# Patient Record
Sex: FEMALE | Race: BLACK OR AFRICAN AMERICAN | Employment: UNEMPLOYED | URBAN - METROPOLITAN AREA
[De-identification: names, ages, dates, MRNs, and addresses within clinical notes are randomized per-mention and may not be internally consistent; named-entity substitution may affect disease eponyms.]

---

## 2019-09-18 PROBLEM — N81.10 FEMALE CYSTOCELE: Status: ACTIVE | Noted: 2017-08-15

## 2019-10-23 ENCOUNTER — OFFICE VISIT (OUTPATIENT)
Dept: FAMILY MEDICINE CLINIC | Age: 74
End: 2019-10-23

## 2019-10-23 VITALS
HEIGHT: 64 IN | BODY MASS INDEX: 28.1 KG/M2 | DIASTOLIC BLOOD PRESSURE: 63 MMHG | TEMPERATURE: 96.8 F | SYSTOLIC BLOOD PRESSURE: 135 MMHG | HEART RATE: 62 BPM | RESPIRATION RATE: 16 BRPM | WEIGHT: 164.6 LBS | OXYGEN SATURATION: 97 %

## 2019-10-23 DIAGNOSIS — T86.10 RENAL TRANSPLANT DISORDER: ICD-10-CM

## 2019-10-23 DIAGNOSIS — D64.9 ANEMIA, UNSPECIFIED TYPE: ICD-10-CM

## 2019-10-23 DIAGNOSIS — E78.5 DYSLIPIDEMIA: ICD-10-CM

## 2019-10-23 DIAGNOSIS — F41.9 ANXIETY: Primary | ICD-10-CM

## 2019-10-23 DIAGNOSIS — D51.9 ANEMIA DUE TO VITAMIN B12 DEFICIENCY, UNSPECIFIED B12 DEFICIENCY TYPE: ICD-10-CM

## 2019-10-23 DIAGNOSIS — I48.0 PAF (PAROXYSMAL ATRIAL FIBRILLATION) (HCC): ICD-10-CM

## 2019-10-23 DIAGNOSIS — R73.03 PREDIABETES: ICD-10-CM

## 2019-10-23 DIAGNOSIS — M81.0 AGE-RELATED OSTEOPOROSIS WITHOUT CURRENT PATHOLOGICAL FRACTURE: ICD-10-CM

## 2019-10-23 DIAGNOSIS — D84.9 IMMUNOSUPPRESSED STATUS (HCC): ICD-10-CM

## 2019-10-23 DIAGNOSIS — D05.11 DUCTAL CARCINOMA IN SITU (DCIS) OF RIGHT BREAST: ICD-10-CM

## 2019-10-23 DIAGNOSIS — I10 ESSENTIAL HYPERTENSION: ICD-10-CM

## 2019-10-23 DIAGNOSIS — R79.9 ABNORMAL FINDING OF BLOOD CHEMISTRY, UNSPECIFIED: ICD-10-CM

## 2019-10-23 DIAGNOSIS — Z79.01 CHRONIC ANTICOAGULATION: ICD-10-CM

## 2019-10-23 DIAGNOSIS — Z79.52 LONG TERM CURRENT USE OF SYSTEMIC STEROIDS: ICD-10-CM

## 2019-10-23 RX ORDER — FUROSEMIDE 40 MG/1
40 TABLET ORAL
COMMUNITY

## 2019-10-23 RX ORDER — TRAMADOL HYDROCHLORIDE 300 MG/1
TABLET, EXTENDED RELEASE ORAL
Refills: 0 | COMMUNITY
Start: 2019-10-16 | End: 2020-01-23 | Stop reason: DRUGHIGH

## 2019-10-23 RX ORDER — PRAVASTATIN SODIUM 20 MG/1
1 TABLET ORAL DAILY
Refills: 0 | COMMUNITY
Start: 2019-08-06 | End: 2020-07-28

## 2019-10-23 RX ORDER — CARBOXYMETHYLCELLULOSE SODIUM 5 MG/ML
1 SOLUTION/ DROPS OPHTHALMIC 2 TIMES DAILY
COMMUNITY
End: 2020-09-21

## 2019-10-23 RX ORDER — PERPHENAZINE 2 MG
1000 TABLET ORAL DAILY
COMMUNITY

## 2019-10-23 RX ORDER — ESOMEPRAZOLE MAGNESIUM 40 MG/1
1 CAPSULE, DELAYED RELEASE ORAL 2 TIMES DAILY
COMMUNITY
End: 2020-09-21

## 2019-10-23 RX ORDER — DILTIAZEM HYDROCHLORIDE 60 MG/1
1 CAPSULE, EXTENDED RELEASE ORAL 2 TIMES DAILY
COMMUNITY
Start: 2019-07-16

## 2019-10-23 RX ORDER — METOPROLOL TARTRATE 50 MG/1
50 TABLET ORAL 2 TIMES DAILY
COMMUNITY
End: 2020-02-24

## 2019-10-23 RX ORDER — KETOTIFEN FUMARATE 0.35 MG/ML
SOLUTION/ DROPS OPHTHALMIC
COMMUNITY
End: 2019-10-23 | Stop reason: SDUPTHER

## 2019-10-23 RX ORDER — MELATONIN
7000
COMMUNITY

## 2019-10-23 RX ORDER — LOSARTAN POTASSIUM 100 MG/1
100 TABLET ORAL
COMMUNITY
Start: 2019-07-24

## 2019-10-23 NOTE — PROGRESS NOTES
[address] [facility]  Primary Care Office Visit - Problem-Oriented    : 1945   Víctor Troncoso is a 69 y.o. female presenting for  Chief Complaint   Patient presents with   [facility] Establish Care            Assessment/Plan:       ICD-10-CM ICD-9-CM   1. Anxiety F41.9 300.00   2. Immunosuppressed status ([address] D89.9 279.9   3. PAF (paroxysmal atrial fibrillation) (HCC) I48.0 427.31   4. Chronic anticoagulation Z79.01 V58.61   5. Long term current use of systemic steroids Z79.52 V58.65   6. Essential hypertension I10 401.9   7. Dyslipidemia E78.5 272.4   8. 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 233.0   9. Renal transplant disorder T86.10 996.81   10. Prediabetes R73.03 790.29     Initial encounter with Víctor Troncoso to establish care. Very complex medical history. Reviewed records available via [employer]. Requesting records from the other providers (renal, ID, psych, general surgery, rheum). Víctor Troncoso is a very intelligent and proactive patient. Main concern is that she'd like to make sure that her health maintenance items are \"not lost in the shuffle\". Future considerations:  - update lipid and A1c  - will defer vaccinations to ID (shingrix? PCV13?)  - will review records for c-scope and DEXA    Spent 45min face-to-face, >50% spent on extensive history review with patient as well as counseling re: high incidence of anxiety/depression in patients who suffer from chronic disease. This document may have been created with the aid of dictation software. Text may contain errors, particularly phonetic errors. Reviewed management plan & instructions with patient, who voiced understanding.          Slim Ernst MD  Internal Medicine, Family Medicine & Sports Medicine  10/23/2019    Subjective   History:   Víctor Troncoso is a 69 y.o. female presenting to address:  Chief Complaint   Patient presents with   [facility] Establish Care     Here to establish care with a new PCP.      Recent abscess / infected lumbar hardware s/p removal:  PICC line since 9/6/2019 in [place]  Overall doing okay  Managed by Dr. Laly Cano    Hip OA:  Wants a hip replacement, however knows she needs to wait until cleared by ID    Anxiety:  Followed by psych, Dr. Julián Rodas  Worsens with health issues, naturally  Admits she is afraid of taking strong pain medication  Doesn't like being on the xanax, will be trying to wean down  Sees counselor with Dr. Yajaira [place] office, Abbie Leyva    Pain mgmt:  Dr. Mendoza Lias for tramadol  \"I really don't like taking pain medication at all\"    S/p renal transplant: Followed by renal (Joycelyn) and ID (Sunny)    Hx of breast cancer:  S/p mastectomy  tx plan is currently observation        Referred by Dr. Anita De La Cruz team / frequency of appts:  Renal: (Joycelyn) Every 4-5 months.   ID: Layton Soni) started in September; q2wk  Cards: Prosper Wen) q6mo  Neurosurgeon: (alexx) only PRN  Heme/onc: Jw Sofia next month      Past Medical History:   Diagnosis Date    Anxiety     Arthropathy     Atrophic vaginitis     Bladder tumor     Coronary artery disease     Crohn disease ([address]     Depression     DJD (degenerative joint disease)     Esophageal reflux     Female cystocele     Fibrocystic breast     Fibromyalgia     GERD (gastroesophageal reflux disease)     History of kidney transplant     Hypercholesteremia     Hypertension     IBS (irritable bowel syndrome)     Lower urinary tract symptoms (LUTS)     Nephrogenic adenoma of bladder     Nocturia     Osteopenia     Pelvic organ prolapse quantification stage 1 cystocele     Renal failure     Shingles     [facility]ogren's syndrome [facility]     Sleep apnea      Past Surgical History:   Procedure Laterality Date    HX BACK SURGERY  4/2014    HX BACK SURGERY  09/2019    hardware removed    HX BREAST LUMPECTOMY  1998    HX CERVICAL FUSION  2007    HX COLECTOMY  1984    HX CORONARY STENT PLACEMENT  2005;2008    HX GYN  HX HEART CATHETERIZATION  1/2008    HX HEMORRHOIDECTOMY  1993    HX KNEE REPLACEMENT  2008    HX KNEE REPLACEMENT  3/2015    left    HX LIPECTOMY  2001    HX MOHS PROCEDURES  1996    HX PARATHYROIDECTOMY  1993    HX RENAL TRANSPLANT  1992    HX RENAL TRANSPLANT  1992    HX TUBAL LIGATION  1977      reports that she has quit smoking. She has never used smokeless tobacco. She reports that she does not drink alcohol or use drugs. Social History     Social History Narrative    Oct 2019:  x 38 years. Used to work for [employer]. Received kidney transplant in 1992. Social History     Tobacco Use   Smoking Status Former Smoker   Smokeless Tobacco Never Used     Family History   Problem Relation Age of Onset    Ovarian Cancer Mother     Diabetes Sister     Hypertension Sister     Diabetes Brother     Hypertension Brother     Hypertension Brother     Diabetes Sister      Allergies   Allergen Reactions    Atorvastatin Myalgia     Other reaction(s): Unknown    Azathioprine Other (comments)     Other reaction(s): Unknown    Iodinated Contrast Media Other (comments)     Had kidney transplant      Ketorolac Tromethamine Other (comments) and Itching       Problem List:      Patient Active Problem List    Diagnosis    Long term current use of systemic steroids    Renal transplant disorder    Ductal carcinoma in situ (DCIS) of right breast    Female cystocele    Immunosuppressed status ([address]    PAF ([facility]oxysmal atrial fibrillation) [facility]    Prediabetes    Thoracic spondylosis without myelopathy    Stented coronary artery    Bladder tumor    Cervical spondylosis without myelopathy    Dyslipidemia    Essential hypertension    Sjogren's syndrome (HCC)       Medications:     Current Outpatient Medications   Medication Sig    carboxymethylcellulose sodium (CELLUVISC) 0.5 % drop ophthalmic solution Administer 1 Drop to both eyes two (2) times a day.     cholecalciferol (VITAMIN D3) (1000 Units /25 mcg) tablet Take 7,000 Units by mouth every three (3) days.  denosumab (PROLIA) 60 mg/mL injection 60 mg by SubCUTAneous route every 6 months.  dilTIAZem SR (CARDIZEM SR) 60 mg SR capsule Take 1 Cap by mouth two (2) times a day.  esomeprazole (NEXIUM) 40 mg capsule Take 1 Cap by mouth two (2) times a day.  krill-om-3-dha-epa-phospho-ast (KRILL OIL) 1,170-790-99-80 mg cap Take 1,000 mg by mouth two (2) times a day.  methylcellulose (CITRUCEL) 500 mg tablet Take 500 mg by mouth daily.  metoprolol tartrate (LOPRESSOR) 50 mg tablet Take 50 mg by mouth two (2) times a day.  pravastatin (PRAVACHOL) 20 mg tablet Take 1 Tab by mouth daily.  furosemide (LASIX) 40 mg tablet Take 40 mg by mouth daily as needed.  losartan (COZAAR) 100 mg tablet Take 100 mg by mouth nightly.  traMADol (ULTRAM-ER) 300 mg tablet take 1 tablet by mouth once daily DISCONTINUE TRAMADOL 50 MG    cefTRIAXone (ROCEPHIN) 2 gram/100 mL IVPB 2 g by IntraVENous route every twenty-four (24) hours.  multivit-min/FA/lutein/zeaxant (MACULAR VITAMIN PO) Take 1 Tab by mouth daily.  Lactobacillus acidophilus (PROBIOTIC PO) Take 1 Cap by mouth daily.  warfarin (COUMADIN) 5 mg tablet Take 5 mg by mouth daily.  ketotifen (ZADITOR) 0.025 % (0.035 %) ophthalmic solution Administer 1 Drop to both eyes two (2) times a day.  acyclovir (ZOVIRAX) 5 % ointment Apply  to affected area every three (3) hours.  magnesium oxide (MAG-OX) 400 mg tablet Take 800 mg by mouth two (2) times a day.  potassium chloride (K-DUR, KLOR-CON) 20 mEq tablet Take 20 mEq by mouth daily.  predniSONE (DELTASONE) 5 mg tablet Take 5 mg by mouth daily.  ALPRAZolam (XANAX) 0.5 mg tablet Take 0.5 mg by mouth two (2) times daily as needed.  mycophenolate sodium (MYFORTIC) 360 mg delayed release tablet Take 720 mg by mouth two (2) times a day. No current facility-administered medications for this visit.         Review of Systems:     Review of Systems   Constitutional: Negative for chills and fever. HENT: Negative for ear pain and sore throat. Respiratory: Negative for cough and shortness of breath. Cardiovascular: Negative for chest pain and palpitations. Gastrointestinal: Negative for abdominal pain. Genitourinary: Negative for dysuria. Musculoskeletal: Positive for back pain. Negative for myalgias. Skin: Negative for rash. Neurological: Negative for speech change, focal weakness and headaches. Endo/Heme/Allergies: Does not bruise/bleed easily. Psychiatric/Behavioral: Negative for depression. The patient is nervous/anxious. The patient does not have insomnia. Objective   Physical Assessment:   VS:    Vitals:    10/23/19 1415   BP: 135/63   Pulse: 62   Resp: 16   Temp: 96.8 °F (36 °C)   TempSrc: Oral   SpO2: 97%   Weight: 164 lb 9.6 oz (74.7 kg)   Height: 5' 4\" (1.626 m)   PainSc:   4   PainLoc: Back     Physical Exam   Constitutional: She is oriented to person, place, and time. She appears well-developed and well-nourished. overweight   HENT:   Head: Normocephalic and atraumatic. Eyes: EOM are normal.   Neck: Neck supple. No thyromegaly present. Cardiovascular: Normal rate, regular rhythm and intact distal pulses. No murmur heard. Pulmonary/Chest: Effort normal and breath sounds normal. She has no wheezes. She has no rales. Abdominal:       Musculoskeletal:        Back:    Neurological: She is alert and oriented to person, place, and time. No cranial nerve deficit. Coordination normal.   Skin: Skin is warm and dry. She is not diaphoretic. Psychiatric: She has a normal mood and affect. Her behavior is normal. Judgment and thought content normal.   Nursing note reviewed. Records Review:     Last PCP note (10/4/2019):    OM spine/infected hardware-cont CTX per PICC per ID. No pain, fever or chills more than usual. NSG treating her pain with narcotics.     HTN-taking all medication, without dizziness, chest pain, SOB or HA. Dyslipidemia-cont pravastatin without significant myalgia    PAF-Denies SOB. No bleeding while on coumadin. Occasional palpitations, but rapidly resolves. S/p kidney transplant-cont immunosuppression. No mention of symptoms of uremia, such as itching, nausea or mental status changes. Followed by nephrology and transplant ID. Has vague sx of feeling \"heavy headed,\" dizzy. Not vertigo or pre-syncope    Anemia of chronic disease-Hb about 10 and stable. PMH/PSH/FH/MEDS/ALLERGIES/SH:All reviewed and updated today. Please see history and medication list in EPIC. ASSESSMENT AND PLAN:    (E78.5) Dyslipidemia    (I48.0) PAF (paroxysmal atrial fibrillation)     (D89.9) Immunocompromised patient ([address]    (G89.4) Chronic pain syndrome    (Z94.0) Kidney replaced by transplant    (D89.9) Immunosuppressed status     (M86.9) Infection of lumbar spine (HCC)    (D63.8) Anemia of chronic disease   Spine infx management per ID. Dizziness vague. Follow. HTN controlled. Cont same. Cont statin for ASCVD risk reduction  A.fib rate controlled. Cont AC for stroke risk reduction. Palpitations may be a.fib related. Sounds benign. All else stable. F/u with sub-specialists. RTC 3 mo. Neurosurgery note (9/17/2019):    Varinder Medrano returns to the office today 2 weeks status post Removal of lumbar hardware (screws, rods, and cross-link), incision and drainage of wound. The patient is doing well. Assessment:    G06.1 Abscess in epidural space of lumbar spine Yes     Status post Removal of lumbar hardware (screws, rods, and cross-link), incision and drainage of wound    Plan: The patient will follow up with me in 6 week(s)with xrays. She will call ID to arrange follow-up. .    Cardiology note (8/27/2019):      Chief complaint: CAD/PAF/chronic anticaog/SOB/htn/dyslipidemia    Impression: Varinder Medrano is a 69 y.o. female seen for   (I48.0) Paroxysmal atrial fibrillation (HCC)    (Z79.01) Chronic anticoagulation    (I25.10) Coronary artery disease involving native coronary artery of native heart without angina pectoris    (I10) Essential hypertension    (E78.5) Dyslipidemia    (Z95.5) Stented coronary artery    Recommendations: CAD/PAF/chronic anticaog/SOB/htn/dyslipidemia: Continue her current cardiac medications. At this point this point, it appears that her A. fib is relatively infrequent and self-limiting thus will not move onto antiarrhythmic therapy. Return in about 3 months (around 11/27/2019). Instructed to call the office prior to next visit if needed. SUBJECTIVE/INTERVAL EVENTS: CAD/PAF/chronic anticaog/SOB/htn/dyslipidemia: She was seen on 7/16. At that time, she was reporting some increased palpitations and she also had a documented episode of A. fib during a colonoscopy on 7/11. She was changed from nifedipine to diltiazem SR 60 mg twice per day. A 2-week monitor was obtained. This demonstrated sinus rhythm as the primary rhythm. There was one episode of self-limiting A. fib. The overall A. fib burden was less than 1%. Today in the office she reports that she has had a couple of non-cardiac problems. She has had a couple of abscesses that needed to be drained. She also has significant hip pain and was recommended to undergo hip replacement. This is on hold until her infections are completely resolved. Generally blood pressures are controlled except for when she is in pain. She is not having any problems with her Coumadin. She denies any palpitations since her last office visit in July. In fact she was hospitalized for at her abscess for a few days on telemetry without any A. fib as well. No chest pain, tightness, or pressure. No SOB, orthopnea, or PND. No dizziness, lightheadedness, or syncope. No edema. No palpitations. Review of systems: No fever, chills, nausea, vomiting. No bleeding.      Heme/onc note (8/22/2019):    ONCOLOGY HISTORY  I. pTXpN0(i+). Right DCIS is ER positive, UT negative, her 2 Karen positive (3+). [place] nodes: ER negative, UT negative, her 2-. A. Bilateral mammogram, 08/06/2018. 3 mm grouping of amorphous microcalcifications at 9 o'clock in the middle 1/3 of the right breast, suspicious. B. stereotactic core biopsies of the right breast, 08/16/2018. DCIS, nuclear grade 2. Cribriform type with central necrosis and calcifications. ER positive, UT positive. C. Excisional biopsy of the right breast, 09/27/2018, by Dr. Deepa Faust. DCIS, grade 2. Approximately 3.3 cm. DCIS extends to medial, inferior, and deep margins. 0/1 nodes. ER positive, UT positive. D. Bilateral mastectomy and sentinel node biopsy, 11/06/2018 by Dr. Elizabeth Cartwright. Left breast: Simple fibrocystic disease of the breast. Sclerosing adenosis. No carcinoma seen. Right breast: Residual ductal carcinoma in situ in the lateral inferior quadrant measuring at least 4.5 x 4.2 x 3.5 cm in greatest dimension. Grade 2. No invasive carcinoma seen. ER positive, UT negative, her 2 Karen positive (3+). Right axillary sentinel nodes: 2/2 with metastatic ductal carcinoma (less than 200 cells). [place] lymph nodes: ER negative, UT negative, her 2 Karen negative. PTXpN0(i+)  BELIA Loly on 11/20/18: Negative for clinically significant mutations. F. Adjuvant Radiation under the direction of Dr. Sindi Jha from 12/13/18 through 1/18/19. CURRENT TREATMENT   Observation     HPI:  The patient is a very pleasant 69 y.o. female who presents today for f/u of R breast cancer metastatic to the lymph nodes and DCIS. She states that she does not feel well. She reports a recent ED visit related to an infected abscess and need for IV antibiotics. She was admitted for several days. She continues on oral antibiotics since discharge. Reports that prior to this she suffered a fungal infection in her esophagus. She endorses that her INRs have been unstable with the use of multiple antibiotics.  Endorses use of tramadol for discomfort, with good effect. Reports that she will see Dr. Harshad Connolly in November. ECOG performance status is 0. Recent Labs & Imaging:                           L-spine XR (9/10/2019):    1. Postoperative findings as described, including removal of rods and screws from the lumbar spine. 2. No evidence for acute fracture or malalignment. No evidence for instability with flexion or extension, but very limited movement on the flexion/extension views. 3. Degenerative findings are as described. Echo (9/9/2019):     NORMAL LEFT VENTRICULAR CAVITY SIZE AND SYSTOLIC FUNCTION WITH AN EJECTION FRACTION OF 70%. DIASTOLIC FUNCTION INDETERMINATE. NORMAL RIGHT VENTRICULAR SIZE AND SYSTOLIC FUNCTION. NO HEMODYNAMICALLY SIGNIFICANT VALVULAR PATHOLOGY. INADEQUATE TRICUSPID REGURGITATION FOR PULMONARY ARTERY PRESSURE ESTIMATE. MRI L-spine (9/2/2019):    1. Right paracentral dorsal subcutaneous fluid collection at the L5/S1 level with internal pigtail drainage catheter. This collection extends to the skin surface, extends to the right laterally dorsal to the posterior right iliac bone, and extends ventrally along the paraspinous musculature, contiguous with the posterior deisy/instrumentation at the L5/S1 level. If this is infected fluid/abscess, the possibility of infection involving the hardware should be considered. 2. Additional nonspecific myositis/phlegmon involving paraspinous musculature and soft tissues dorsal to the canal at the L5 level. 3. Previous dorsal decompression and circumferential fusion L3-S1 with no high-grade canal stenosis at these levels.     4. Mild acquired junctional canal stenosis L2/3.    5. 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

## 2019-10-23 NOTE — PROGRESS NOTES
Deyvi Lowe is a 69 y.o. female (: 1945) presenting to address:    Chief Complaint   Patient presents with   [address]:    10/23/19 1415   BP: 135/63   Pulse: 62   Resp: 16   Temp: 96.8 °F (36 °C)   TempSrc: Oral   SpO2: 97%   Weight: 164 lb 9.6 oz (74.7 kg)   Height: 5' 4\" (1.626 m)   PainSc:   4   PainLoc: Back       Hearing/Vision:   No exam data present    Learning Assessment:     Learning Assessment 10/23/2019   PRIMARY LEARNER Patient   HIGHEST LEVEL OF EDUCATION - PRIMARY LEARNER  2 YEARS OF COLLEGE   BARRIERS PRIMARY LEARNER NONE   CO-LEARNER CAREGIVER No   PRIMARY LANGUAGE ENGLISH   LEARNER PREFERENCE PRIMARY LISTENING   ANSWERED BY stephenie   RELATIONSHIP SELF     Depression Screening:     3 most recent PHQ Screens 10/23/2019   Little interest or pleasure in doing things Not at all   Feeling down, depressed, irritable, or hopeless Not at all   Total Score PHQ 2 0     Fall Risk Assessment:     Fall Risk Assessment, last 12 mths 10/23/2019   Able to walk? Yes   Fall in past 12 months? No     Abuse Screening:   No flowsheet data found. Coordination of Care Questionaire:   1. Have you been to the ER, urgent care clinic since your last visit? Hospitalized since your last visit? NO    2. Have you seen or consulted any other health care providers outside of the [address] [place] [address] since your last visit? Include any pap smears or colon screening. NO    Advanced Directive:   1. Do you have an Advanced Directive? NO    2. Would you like information on Advanced Directives?  NO

## 2019-10-23 NOTE — PATIENT INSTRUCTIONS
To Do: 
- every single time you see one of your specialists, please give them my business card, and tell them to send me copies of their notes TESTING RESULTS Results will be released to [address] Normal results will be sent via mail. If you have questions about your results, please schedule a follow up appointment to discuss with your PCP. MEDICATION REFILLS Please allow at least 2 business days for refill requests to be addressed. Medication refills may be requested through your pharmacy. Refills will not be provided by the after hours/on call provider. Depression and Chronic Disease: Care Instructions Your Care Instructions A chronic disease is one that you have for a long time. Some chronic diseases can be controlled, but they usually cannot be cured. Depression is common in people with chronic diseases, but it often goes unnoticed. Many people have concerns about seeking treatment for a mental health problem. You may think it's a sign of weakness, or you don't want people to know about it. It's important to overcome these reasons for not seeking treatment. Treating depression or anxiety is good for your health. Follow-up care is a key part of your treatment and safety. Be sure to make and go to all appointments, and call your doctor if you are having problems. It's also a good idea to know your test results and keep a list of the medicines you take. How can you care for yourself at home? Watch for symptoms of depression The symptoms of depression are often subtle at first. You may think they are caused by your disease rather than depression. Or you may think it is normal to be depressed when you have a chronic disease. If you are depressed you may: · Feel sad or hopeless. · Feel guilty or worthless. · Not enjoy the things you used to enjoy. · Feel hopeless, as though life is not worth living. · Have trouble thinking or remembering. · Have low energy, and you may not eat or sleep well. · Pull away from others. · Think often about death or killing yourself. (Keep the numbers for these national suicide hotlines: 5-829-028-TALK [1-560.460.3254] and 8-748-MYSYQSZ [1-976.118.2698]. ) Get treatment By treating your depression, you can feel more hopeful and have more energy. If you feel better, you may take better care of yourself, so your health may improve. · Talk to your doctor if you have any changes in mood during treatment for your disease. · Ask your doctor for help. Counseling, antidepressant medicine, or a combination of the two can help most people with depression. Often a combination works best. Counseling can also help you cope with having a chronic disease. When should you call for help? Call 911 anytime you think you may need emergency care. For example, call if: 
  · You feel like hurting yourself or someone else.  
  · Someone you know has depression and is about to attempt or is attempting suicide.  
[facility] your doctor now or seek immediate medical care if: 
  · You hear voices.  
  · Someone you know has depression and: 
? Starts to give away his or her possessions. ? Uses illegal drugs or drinks alcohol heavily. ? Talks or writes about death, including writing suicide notes or talking about guns, knives, or pills. ? Starts to spend a lot of time alone. ? Acts very aggressively or suddenly appears calm.  
 Watch closely for changes in your health, and be sure to contact your doctor if: 
  · You do not get better as expected. Where can you learn more? Go to http://kenneth-christophe.info/. Enter L297 in the search box to learn more about \"Depression and Chronic Disease: Care Instructions. \" Current as of: May 28, 2019 Content Version: 12.2 © 0759-3234 [employer].  Care instructions adapted under license by [employer] (which disclaims liability or warranty for this information). If you have questions about a medical condition or this instruction, always ask your healthcare professional. [address] any warranty or liability for your use of this information. Patient Care Team: 
Christopher Truong MD as PCP - General (Family Practice) Richar Baron MD (Cardiology) Santa Sutherland MD (Neurosurgery) Ramila Burnette MD (Hematology and Oncology) Kwan Akins MD (Infectious Diseases) Jorge Estevez MD (Nephrology) Evelyne Newman MD (Surgery) Marizol House MD (Physical Medicine and Rehab) So Noonan MD (Psychiatry) Peg Taylor MD (Rheumatology) Carina Gutierrez MD (Obstetrics & Gynecology)

## 2019-10-29 PROBLEM — Z79.52 LONG TERM CURRENT USE OF SYSTEMIC STEROIDS: Status: ACTIVE | Noted: 2019-10-29

## 2019-10-29 PROBLEM — T86.10 RENAL TRANSPLANT DISORDER: Status: ACTIVE | Noted: 2019-08-16

## 2019-10-29 PROBLEM — D05.11 DUCTAL CARCINOMA IN SITU (DCIS) OF RIGHT BREAST: Status: ACTIVE | Noted: 2018-10-02

## 2019-12-08 ENCOUNTER — TELEPHONE (OUTPATIENT)
Dept: FAMILY MEDICINE CLINIC | Age: 74
End: 2019-12-08

## 2019-12-08 NOTE — TELEPHONE ENCOUNTER
Please call Aby Brannon and inform her that fasting bloodwork orders have been placed for her to have done prior to her upcoming appointment.     Future Appointments   Date Time Provider Liz Berumen   1/23/2020  1:30 PM Odalys Yates MD [facility]

## 2020-01-16 ENCOUNTER — HOSPITAL ENCOUNTER (OUTPATIENT)
Dept: LAB | Age: 75
Discharge: HOME | End: 2020-01-16
Payer: MEDICARE

## 2020-01-16 DIAGNOSIS — D84.9 IMMUNOSUPPRESSED STATUS (HCC): ICD-10-CM

## 2020-01-16 DIAGNOSIS — M81.0 AGE-RELATED OSTEOPOROSIS WITHOUT CURRENT PATHOLOGICAL FRACTURE: ICD-10-CM

## 2020-01-16 DIAGNOSIS — Z79.52 LONG TERM CURRENT USE OF SYSTEMIC STEROIDS: ICD-10-CM

## 2020-01-16 DIAGNOSIS — T86.10 RENAL TRANSPLANT DISORDER: ICD-10-CM

## 2020-01-16 DIAGNOSIS — R79.9 ABNORMAL FINDING OF BLOOD CHEMISTRY, UNSPECIFIED: ICD-10-CM

## 2020-01-16 DIAGNOSIS — R73.03 PREDIABETES: ICD-10-CM

## 2020-01-16 LAB
25(OH)D3 SERPL-MCNC: 41.8 NG/ML (ref 30–100)
BASOPHILS # BLD: 0 K/UL (ref 0–0.1)
BASOPHILS NFR BLD: 0 % (ref 0–2)
DIFFERENTIAL METHOD BLD: ABNORMAL
EOSINOPHIL # BLD: 0.2 K/UL (ref 0–0.4)
EOSINOPHIL NFR BLD: 3 % (ref 0–5)
ERYTHROCYTE [DISTWIDTH] IN BLOOD BY AUTOMATED COUNT: 19.8 % (ref 11.6–14.5)
HCT VFR BLD AUTO: 38.9 % (ref 35–45)
HGB BLD-MCNC: 11.9 G/DL (ref 12–16)
LYMPHOCYTES # BLD: 1.3 K/UL (ref 0.9–3.6)
LYMPHOCYTES NFR BLD: 22 % (ref 21–52)
MCH RBC QN AUTO: 20.8 PG (ref 24–34)
MCHC RBC AUTO-ENTMCNC: 30.6 G/DL (ref 31–37)
MCV RBC AUTO: 68 FL (ref 74–97)
MONOCYTES # BLD: 0.5 K/UL (ref 0.05–1.2)
MONOCYTES NFR BLD: 9 % (ref 3–10)
NEUTS SEG # BLD: 3.9 K/UL (ref 1.8–8)
NEUTS SEG NFR BLD: 66 % (ref 40–73)
PLATELET # BLD AUTO: 340 K/UL (ref 135–420)
PMV BLD AUTO: 10 FL (ref 9.2–11.8)
RBC # BLD AUTO: 5.72 M/UL (ref 4.2–5.3)
T4 FREE SERPL-MCNC: 1 NG/DL (ref 0.7–1.5)
WBC # BLD AUTO: 5.9 K/UL (ref 4.6–13.2)

## 2020-01-16 PROCEDURE — 83540 ASSAY OF IRON: CPT

## 2020-01-16 PROCEDURE — 82607 VITAMIN B-12: CPT

## 2020-01-16 PROCEDURE — 80053 COMPREHEN METABOLIC PANEL: CPT

## 2020-01-16 PROCEDURE — 80061 LIPID PANEL: CPT

## 2020-01-16 PROCEDURE — 85025 COMPLETE CBC W/AUTO DIFF WBC: CPT

## 2020-01-16 PROCEDURE — 84439 ASSAY OF FREE THYROXINE: CPT

## 2020-01-16 PROCEDURE — 84443 ASSAY THYROID STIM HORMONE: CPT

## 2020-01-16 PROCEDURE — 83036 HEMOGLOBIN GLYCOSYLATED A1C: CPT

## 2020-01-16 PROCEDURE — 82306 VITAMIN D 25 HYDROXY: CPT

## 2020-01-16 PROCEDURE — 36415 COLL VENOUS BLD VENIPUNCTURE: CPT

## 2020-01-16 PROCEDURE — 82728 ASSAY OF FERRITIN: CPT

## 2020-01-17 LAB
ALBUMIN SERPL-MCNC: 3.8 G/DL (ref 3.4–5)
ALBUMIN/GLOB SERPL: 1.1 {RATIO} (ref 0.8–1.7)
ALP SERPL-CCNC: 106 U/L (ref 45–117)
ALT SERPL-CCNC: 22 U/L (ref 13–56)
ANION GAP SERPL CALC-SCNC: 9 MMOL/L (ref 3–18)
AST SERPL-CCNC: 15 U/L (ref 10–38)
BILIRUB SERPL-MCNC: 0.3 MG/DL (ref 0.2–1)
BUN SERPL-MCNC: 12 MG/DL (ref 7–18)
BUN/CREAT SERPL: 13 (ref 12–20)
CALCIUM SERPL-MCNC: 9.7 MG/DL (ref 8.5–10.1)
CHLORIDE SERPL-SCNC: 108 MMOL/L (ref 100–111)
CHOLEST SERPL-MCNC: 245 MG/DL
CO2 SERPL-SCNC: 22 MMOL/L (ref 21–32)
CREAT SERPL-MCNC: 0.94 MG/DL (ref 0.6–1.3)
EST. AVERAGE GLUCOSE BLD GHB EST-MCNC: 137 MG/DL
GLOBULIN SER CALC-MCNC: 3.4 G/DL (ref 2–4)
GLUCOSE SERPL-MCNC: 92 MG/DL (ref 74–99)
HBA1C MFR BLD: 6.4 % (ref 4.2–5.6)
HDLC SERPL-MCNC: 74 MG/DL (ref 40–60)
HDLC SERPL: 3.3 {RATIO} (ref 0–5)
LDLC SERPL CALC-MCNC: 148.4 MG/DL (ref 0–100)
LIPID PROFILE,FLP: ABNORMAL
POTASSIUM SERPL-SCNC: 3.8 MMOL/L (ref 3.5–5.5)
PROT SERPL-MCNC: 7.2 G/DL (ref 6.4–8.2)
SODIUM SERPL-SCNC: 139 MMOL/L (ref 136–145)
TRIGL SERPL-MCNC: 113 MG/DL (ref ?–150)
TSH SERPL DL<=0.05 MIU/L-ACNC: 1.42 UIU/ML (ref 0.36–3.74)
VLDLC SERPL CALC-MCNC: 22.6 MG/DL

## 2020-01-20 LAB
FERRITIN SERPL-MCNC: 28 NG/ML (ref 8–388)
FOLATE SERPL-MCNC: 19.6 NG/ML (ref 3.1–17.5)
IRON SATN MFR SERPL: 14 %
IRON SERPL-MCNC: 50 UG/DL (ref 50–175)
TIBC SERPL-MCNC: 354 UG/DL (ref 250–450)
VIT B12 SERPL-MCNC: 710 PG/ML (ref 211–911)

## 2020-01-20 NOTE — PROGRESS NOTES
Order faxed to [phone] confirmation received   I also called and spoke to [place] and she confirmed test are being ordered.

## 2020-01-23 ENCOUNTER — OFFICE VISIT (OUTPATIENT)
Dept: FAMILY MEDICINE CLINIC | Age: 75
End: 2020-01-23

## 2020-01-23 VITALS
BODY MASS INDEX: 27.96 KG/M2 | HEART RATE: 58 BPM | RESPIRATION RATE: 16 BRPM | TEMPERATURE: 97.6 F | SYSTOLIC BLOOD PRESSURE: 152 MMHG | HEIGHT: 64 IN | WEIGHT: 163.8 LBS | DIASTOLIC BLOOD PRESSURE: 82 MMHG | OXYGEN SATURATION: 99 %

## 2020-01-23 DIAGNOSIS — Z79.01 CHRONIC ANTICOAGULATION: ICD-10-CM

## 2020-01-23 DIAGNOSIS — M35.00 SJOGREN'S SYNDROME, WITH UNSPECIFIED ORGAN INVOLVEMENT (HCC): ICD-10-CM

## 2020-01-23 DIAGNOSIS — Z13.31 SCREENING FOR DEPRESSION: ICD-10-CM

## 2020-01-23 DIAGNOSIS — D84.9 IMMUNOSUPPRESSED STATUS (HCC): ICD-10-CM

## 2020-01-23 DIAGNOSIS — Z79.52 LONG TERM CURRENT USE OF SYSTEMIC STEROIDS: ICD-10-CM

## 2020-01-23 DIAGNOSIS — I48.91 ATRIAL FIBRILLATION WITH RVR (HCC): ICD-10-CM

## 2020-01-23 DIAGNOSIS — Z13.39 SCREENING FOR ALCOHOLISM: ICD-10-CM

## 2020-01-23 DIAGNOSIS — E78.5 DYSLIPIDEMIA: ICD-10-CM

## 2020-01-23 DIAGNOSIS — M16.11 PRIMARY OSTEOARTHRITIS OF RIGHT HIP: ICD-10-CM

## 2020-01-23 DIAGNOSIS — R73.03 PREDIABETES: ICD-10-CM

## 2020-01-23 DIAGNOSIS — I25.10 CORONARY ARTERY DISEASE INVOLVING NATIVE CORONARY ARTERY OF NATIVE HEART WITHOUT ANGINA PECTORIS: ICD-10-CM

## 2020-01-23 DIAGNOSIS — Z00.00 MEDICARE ANNUAL WELLNESS VISIT, SUBSEQUENT: Primary | ICD-10-CM

## 2020-01-23 DIAGNOSIS — I10 ESSENTIAL HYPERTENSION: ICD-10-CM

## 2020-01-23 RX ORDER — ONDANSETRON 4 MG/1
4 TABLET, FILM COATED ORAL
COMMUNITY
End: 2020-09-21

## 2020-01-23 RX ORDER — CIPROFLOXACIN 500 MG/1
500 TABLET ORAL 2 TIMES DAILY
COMMUNITY
End: 2020-06-23

## 2020-01-23 RX ORDER — TRAMADOL HYDROCHLORIDE 100 MG/1
100 TABLET, EXTENDED RELEASE ORAL DAILY
COMMUNITY
Start: 2019-12-30 | End: 2020-06-23

## 2020-01-23 NOTE — PATIENT INSTRUCTIONS
To Do: - continue monitoring your blood pressure at home. Bring your blood pressure machine with you to the cardiologist. 
- keep giving my business card to all your specialists, asking them to always send me copies of their notes - complete your Advance Directive(s) and bring me copies so I can put them on file. .. so we can be certain that everywhere you go, they have your \"recipe\" of your preferences (so no one has to be stressed and worried when they likely are already at that time!) Notes from your doctor: 
- overall, bloodwork looks good 
- keep working on limiting the amount of sugar / carbs that you eat, as you are \"borderline diabetic\" - cholesterol is \"okay\". ... so keep doing your best with the pravastatin 
- show this to Dr. Cyndi Gomes, and make sure that you are as up-to-date on your vaccines as she wants you to be Medicare Wellness Notes: - Included below is some information about Advance Directives. There is also an excellent website for it for the state [facility] (www. [email or web address]); if you end up making an Advance Directive, bring me a copy so I can put it in your medical record - We did your medicare wellness visit today. Below is your \"5 year preventive services plan\" - Per our records, you are behind on some immunizations. Check with Dr. Cyndi Gomes Health Maintenance Due Topic Date Due  
 DTaP/Tdap/Td  (1 - Tdap) 04/06/1957  Shingles Vaccine (1 of 2) 04/06/1996  Pneumococcal Vaccine (1 of 1 - PPSV23) 04/06/2011 Medicare Wellness Visit, Female The best way to live healthy is to have a lifestyle where you eat a well-balanced diet, exercise regularly, limit alcohol use, and quit all forms of tobacco/nicotine, if applicable. Regular preventive services are another way to keep healthy. Preventive services (vaccines, screening tests, monitoring & exams) can help personalize your care plan, which helps you manage your own care. Screening tests can find health problems at the earliest stages, when they are easiest to treat. [facility] follows the current, evidence-based guidelines published by the [facility] States Shahbaz Zuniga ([facility]) when recommending preventive services for our patients. Because we follow these guidelines, sometimes recommendations change over time as research supports it. (For example, mammograms used to be recommended annually. Even though Medicare will still pay for an annual mammogram, the newer guidelines recommend a mammogram every two years for women of average risk). Of course, you and your doctor may decide to screen more often for some diseases, based on your risk and your co-morbidities (chronic disease you are already diagnosed with). Preventive services for you include: - Medicare offers their members a free annual wellness visit, which is time for you and your primary care provider to discuss and plan for your preventive service needs. Take advantage of this benefit every year! 
-All adults over the age of 72 should receive the recommended pneumonia vaccines. Current USPSTF guidelines recommend a series of two vaccines for the best pneumonia protection.  
-All adults should have a flu vaccine yearly and a tetanus vaccine every 10 years.  
-All adults age 48 and older should receive the shingles vaccines (series of two vaccines). -All adults age 38-68 who are overweight should have a diabetes screening test once every three years.  
-All adults born between [birth year] and 1964 should be screened once for Hepatitis C. 
-Other screening tests and preventive services for persons with diabetes include: an eye exam to screen for diabetic retinopathy, a kidney function test, a foot exam, and stricter control over your cholesterol.  
-Cardiovascular screening for adults with routine risk involves an electrocardiogram (ECG) at intervals determined by your doctor. -Colorectal cancer screenings should be done for adults age 54-65 with no increased risk factors for colorectal cancer. There are a number of acceptable methods of screening for this type of cancer. Each test has its own benefits and drawbacks. Discuss with your doctor what is most appropriate for you during your annual wellness visit. The different tests include: colonoscopy (considered the best screening method), a fecal occult blood test, a fecal DNA test, and sigmoidoscopy. 
 
-A bone mass density test is recommended when a woman turns 65 to screen for osteoporosis. This test is only recommended one time, as a screening. Some providers will use this same test as a disease monitoring tool if you already have osteoporosis. -Breast cancer screenings are recommended every other year for women of normal risk, age 54-69. 
-Cervical cancer screenings for women over age 72 are only recommended with certain risk factors. 02/13/2020 Office Visit Cardiology [address] [place] [address]  
21  (Fax)  Future Appointments Date Time Provider Liz Berumen 2/19/2020 10:15 AM Zain Mcneal  [address] [place]  
 
 
 
 
02/24/2020 Office Visit Hematology and Oncology Nikhil Epstein MD   
[place], [address] [place] [address]  
[phone] (Fax)

## 2020-01-23 NOTE — PROGRESS NOTES
Starr Benjamin is a 69 y.o. female (: 1945) presenting to address:    Chief Complaint   Patient presents with   Real Annual Wellness Visit            Vitals:    20 1348   BP: 165/87   Pulse: (!) 58   Resp: 16   Temp: 97.6 °F (36.4 °C)   TempSrc: Oral   SpO2: 99%   Weight: 163 lb 12.8 oz (74.3 kg)   Height: 5' 4\" (1.626 m)   PainSc:   8   PainLoc: Hip       Hearing/Vision:      Hearing Screening    125Hz 250Hz 500Hz 1000Hz 2000Hz 3000Hz 4000Hz 6000Hz 8000Hz   Right ear:   40 40 40  40     Left ear:   40 0 40  40        Visual Acuity Screening    Right eye Left eye Both eyes   Without correction: 20/20 20/20 20/15   With correction:          Learning Assessment:     Learning Assessment 10/23/2019   PRIMARY LEARNER Patient   HIGHEST LEVEL OF EDUCATION - PRIMARY LEARNER  2 YEARS OF COLLEGE   BARRIERS PRIMARY LEARNER NONE   CO-LEARNER CAREGIVER No   PRIMARY LANGUAGE ENGLISH   LEARNER PREFERENCE PRIMARY LISTENING   ANSWERED BY stephenie   RELATIONSHIP SELF     Depression Screening:     3 most recent PHQ Screens 2020   Little interest or pleasure in doing things Not at all   Feeling down, depressed, irritable, or hopeless Not at all   Total Score PHQ 2 0     Fall Risk Assessment:     Fall Risk Assessment, last 12 mths 2020   Able to walk? Yes   Fall in past 12 months? No     Abuse Screening:     Abuse Screening Questionnaire 2020   Do you ever feel afraid of your partner? N   Are you in a relationship with someone who physically or mentally threatens you? N   Is it safe for you to go home? Y     Coordination of Care Questionaire:   1. Have you been to the ER, urgent care clinic since your last visit? Hospitalized since your last visit? YES 19 [facility] back pain/ nausea    2. Have you seen or consulted any other health care providers outside of the [address] [place] [address] since your last visit? Include any pap smears or colon screening.  YES [facility], Dr. Mandie Moser 12/20/19 GYN, 11/25/19 Dr. Shabnam Heard, 10/31/19 Dr. Boyd Roles Cardiology    Advanced Directive:   1. Do you have an Advanced Directive? NO    2. Would you like information on Advanced Directives?  NO

## 2020-01-23 NOTE — PROGRESS NOTES
[address] [facility]   Primary Care Office Visit - Problem-Oriented (Separate from the [facility])    : 1945   Maryjo Woods is a 69 y.o. female    Assessment/Plan:     4. Immunosuppressed status ([address]  S/p renal transplant. Followed by ID. Likely ortho will need periop antibiotic recs for upcoming hip arthroplasty, particularly in light of recent infection assoc with lumbar spine hardware requiring longterm systemic ABx. Requesting records from ID.    5. Sjogren's syndrome, with unspecified organ involvement ([address]  Unclear if she does have history of this. No mention in notes from rheum. Requesting records from nephrology. 6. Atrial fibrillation with RVR ([facility]  7. Coronary artery disease involving native coronary artery of native heart without angina pectoris  8. Essential hypertension  9. Chronic anticoagulation  Followed by cardiology & nephrology. Ortho will need recs re: perioperative anticoagulation for upcoming hip arthroplasty. Requesting records from nephrology. 10. Dyslipidemia  Ongoing, with some statin-induced myalgia. Reviewed results. Patient trying her best to take statin regularly. No change to current regimen. Key Antihyperlipidemia Meds             krill-om-3-dha-epa-phospho-ast (KRILL OIL) [id number] mg cap (Taking) Take 1,000 mg by mouth two (2) times a day. pravastatin (PRAVACHOL) 20 mg tablet (Taking) Take 1 Tab by mouth daily. 11. Long term current use of systemic steroids  And hx of   12. Prediabetes  With recent A1c of 6.4  Will refrain from starting any glycemic lowering medications at this time. Maryjo Woods aware to minimize her carb intake. 13. Primary osteoarthritis of right hip  Ongoing, quite symptomatic. Has upcoming scheduled THR with Dr. Olga Reynolds. Orders & Diagnoses associated with This Encounter:       ICD-10-CM ICD-9-CM   4. Immunosuppressed status ([address] D89.9 279.9   5.  Sjogren's syndrome, with unspecified organ involvement ([address] M35.00 710.2   6. Atrial fibrillation with RVR (HCC) I48.91 427.31   7. Coronary artery disease involving native coronary artery of native heart without angina pectoris I25.10 414.01   8. Essential hypertension I10 401.9   9. Chronic anticoagulation Z79.01 V58.61   10. Dyslipidemia E78.5 272.4   11. Long term current use of systemic steroids Z79.52 V58.65   12. Prediabetes R73.03 790.29   13. Primary osteoarthritis of right hip M16.11 715.15         Orders Placed This Encounter   [ none associated with the E&M portion of this appointment ]      Nupur Hernandez MD  Internal Medicine, Family Medicine & Sports Medicine  1/23/2020    History:   Allan Hinds is a 69 y.o. female presenting to address:  Chief Complaint   Patient presents with   [facility] Annual Wellness Visit          Here for 2nd visit, establishing herself with me for Primary Care. Reports since last visit:    ED visit on 12/19/2019 for n/v/back pain/abdominal pain, referred there by UC for concern for ileus/infection. Kept for obs for MRI to eval for recurrent lumbar osteomyelitis, which was negative for infection. Has seen neurosgy and ID for follow-up in the interim. Ongoing significant hip pain. Is pursuing replacement as it is significantly affecting her QOL. Has surgery scheduled with Dr. Mehrdad Quintanilla on 3/10/2020. Is using a rolling walker. In anticipation of surgery, has upcoming appts with:  Cardiology  2/13/2020 - will need periop anticoag recs for PAF  Myself on  2/19/2020  Renal/ (Dr. Clovis Márquez) 2/17/2020  Heme/onc  2/24/2020  Infectious dz  2/25/2020 - will need periop antibiotic recs    Wants to review recent bloodwork. \"I want to make sure I don't have diabetes, particularly because I have been on prednisone for so long. \"    \"How is my cholesterol? I get bad muscle pain with the pravachol, so I take it when I can. . maybe every other day\"    Reports she skipped her recent prolia injection \"kind of taking a break from this. Can't really tell if it is adding to my pain or not. \"      [ see AWV documentation for pertinent PMHx, PSHx, FHx, allergies & meds]     Problem List:     [ see AWV documentation for active problem list ]     Review of Systems:     (only positive ROS in this note, all negatives included in  [address] documentation)    Review of Systems   Musculoskeletal: Positive for joint pain (R hip pain). Physical Assessment:   VS:  [ see AWV documentation for Vital Signs ]    Physical Exam  Nursing note reviewed. Constitutional:       Appearance: She is well-developed. She is not diaphoretic. HENT:      Head: Normocephalic and atraumatic. Neck:      Musculoskeletal: Neck supple. Thyroid: No thyromegaly. Cardiovascular:      Rate and Rhythm: Normal rate and regular rhythm. Heart sounds: No murmur. Pulmonary:      Effort: Pulmonary effort is normal.      Breath sounds: Normal breath sounds. No wheezing or rales. Musculoskeletal:      Right hip: She exhibits decreased range of motion. Skin:     General: Skin is warm and dry. Neurological:      Mental Status: She is alert and oriented to person, place, and time. Cranial Nerves: No cranial nerve deficit. Coordination: Coordination normal.      Gait: Gait abnormal (rolling walker as ambulatory aid). Psychiatric:         Behavior: Behavior normal.         Thought Content: Thought content normal.         Judgment: Judgment normal.            Records Review:     Pain mgmt (1/13/2020):    Constipation: rec ducolax pills vs suppositories  Keep the tramadol, robaxin & phenergan as is. Will retry gabapentin 300mg TID titrate to 600mg TID. Neurosurgery (1/2/2020):    HPI: This patient is a 69 y.o. female history of renal transplant and chronically immune compromised here in the office for a hospital follow up regarding fluid collection in the lumbar spine.  She is status post L3-4, L4-5, L5-S1 decompression and fusion in 2016. In May 2017 she was taken to the operating room for lumbar reexploration for possible epidural abscess. No obvious infection was found but there was a questionable phlegmon she was treated on antibiotic's. This past year she presented back to the ED for a fluid collection at L5-S1 and suspected infection in which she is now s/p removal of lumbar hardware and I/D of wound by Dr. Karen Quintana on 9/4/19. Most recently, she presented back to the ED on 12/19 for increased back pain and N/V. There was no evidence of infection on imaging and she was then discharged home on oral abx. Today she is doing well. Her back pain is at most a nagging 2/10 ache that does not radiate. She does complain of slight tenderness to the healed incision. She states she has some RLE weakness but it is related to her right hip that needs replaced. Denies any paraesthesias or B/B incontinence. She is still taking oral antibiotics per ID. IMPRESSION:   69 y.o. female with no evidence of infection today. Suspect she will have some chronic fluid collection around her surgical site. DIAGNOSIS  (Z94.0) History of renal transplant    (M54.5, G89.29) Chronic bilateral low back pain without sciatica    RECOMMENDATIONS, ORDERS, PLAN:  -No evidence of infection.  -Consider trial off antibiotics now that hardware is removed?   -Follow up as needed or if any new concerns arise. All images, assessment and plan reviewed with Dr. Deepa Shelby Physician, who agrees with treatment plan. Heme/onc (11/25/2019):    ONCOLOGY HISTORY  I. pTXpN0(i+). Right DCIS is ER positive, HI negative, her 2 Karen positive (3+). [place] nodes: ER negative, HI negative, her 2-. A. Bilateral mammogram, 08/06/2018. 3 mm grouping of amorphous microcalcifications at 9 o'clock in the middle 1/3 of the right breast, suspicious. B. stereotactic core biopsies of the right breast, 08/16/2018. DCIS, nuclear grade 2.  Cribriform type with central necrosis and calcifications. ER positive, FL positive. C. Excisional biopsy of the right breast, 09/27/2018, by Dr. Alexandra Mayer. DCIS, grade 2. Approximately 3.3 cm. DCIS extends to medial, inferior, and deep margins. 0/1 nodes. ER positive, FL positive. D. Bilateral mastectomy and sentinel node biopsy, 11/06/2018 by Dr. Lara Tineo. Left breast: Simple fibrocystic disease of the breast. Sclerosing adenosis. No carcinoma seen. Right breast: Residual ductal carcinoma in situ in the lateral inferior quadrant measuring at least 4.5 x 4.2 x 3.5 cm in greatest dimension. Grade 2. No invasive carcinoma seen. ER positive, FL negative, her 2 Karen positive (3+). Right axillary sentinel nodes: 2/2 with metastatic ductal carcinoma (less than 200 cells). [place] lymph nodes: ER negative, FL negative, her 2 Karen negative. PTXpN0(i+)  BELIA Salcido on 11/20/18: Negative for clinically significant mutations. F. Adjuvant Radiation under the direction of Dr. Lottie Loya from 12/13/18 through 1/18/19. G.Biopsy of L5-S1 on April 23, 2019 was negative for malignancy. CURRENT TREATMENT   Observation. HPI:  The patient is a very pleasant 69 y.o. female who presents today with her  for f/u of right breast cancer metastatic to the lymph nodes and DCIS. She reports feeling well. She has had some sensitivity to her surgical scars from the bilateral mastectomy. She has been trying different bras and clothes that do not rub as much on her skin. She denies any new masses or changes to her chest or axilla, any shortness of breath, lower extremity edema, or unintentional weight loss. She has been ambulating with a walker at home related to hip pain. She has been followed up another physician talking about possibly doing a hip replacement in Feb 2020. She had the central line removed from her left arm last week and reports healing well. ECOG performance status is 0.       ASSESSMENT AND PLAN    1. Right pTXpN0(i+).  DCIS is ER positive, FL negative, her 2 Karen positive (3+). [place] nodes: ER negative, ND negative, her 2-. S/p bilateral mastectomies for the DCIS component; therefore, per NCCN guidelines, is not a candidate for adjuvant hormonal therapy. The triple negative invasive carcinoma to her lymph nodes was only seen on IHC and is technically treated as node negative. This invasive component was hormone receptor negative therefore she is not on hormonal therapy. Ms. Madison De La Torre has no evidence of breast cancer recurrence on exam or by history and is doing very well clinically. I reassured her that the breast/chest wall pain is common following mastectomy. She has noted improvement with changes to her bra. I did tell her that she needs to let me know if the pain changes in quality or character. We discussed NCCN guidelines for follow-up of breast cancer. She will have a history and physical every 3 months for 2 years then every 6 months for at least 3 years, and then annually or sooner if needed. Mammography every 12 months although routine imaging of a reconstructed breast is not indicated. Unless there are clinical signs or symptoms which are worrisome for recurrent disease, there is no indication for routing imagine studies for metastases screening. I have also encouraged an active lifestyle with at least 150 minutes of moderate exercise weekly, healthy diet, limited alcohol intake and trying to maintain an ideal body weight. These may lead to improved breast cancer outcomes. The patient will have standard laboratory testing and treatments per NCCN guidelines or specific chemotherapy order regimens in iCare templates. Medications and additional testing will be adjusted as needed. A CMP is pending today. 2. [facility]. This will not alter our management. 3. Hip pain. She is being followed by ortho. I told her that there is no contraindication from my standpoint.      4. She had no further questions at this time. She will return in 3 months per NCCN guidelines. Patient was encouraged to call with questions or concerns in the interim. Cardiology (10/31/2019): Impression: Jeri Rendon is a 69 y.o. female seen for   (I48.0) Paroxysmal atrial fibrillation (HCC)    (Z79.01) Chronic anticoagulation    (I25.10) Coronary artery disease involving native coronary artery of native heart without angina pectoris    (I10) Essential hypertension    (E78.5) Dyslipidemia    (Z95.5) Stented coronary artery    Recommendations: CAD/PAF/chronic anticaog/SOB/htn/dyslipidemia: Continue current medications. She had an echo last month, no indication for cardiac testing at this point. Return in about 6 months (around 4/30/2020). Instructed to call the office prior to next visit if needed. /80    SUBJECTIVE/INTERVAL EVENTS: CAD/PAF/chronic anticaog/SOB/htn/dyslipidemia: She is still struggling with her back infection. She had surgery about a month ago at [facility]. She currently has a PICC is receiving IV antibiotic's. Fortunately, throughout this entire timeframe she has been stable from a cardiac standpoint. No specific cardiac problems or symptoms. No chest pain, tightness, or pressure. No SOB, orthopnea, or PND. No dizziness, lightheadedness, or syncope. No edema. No palpitations. Review of systems: No fever, chills, nausea, vomiting. No bleeding. GYN (8/27/2019):    Complex abscess to sacrum s/p drainage. Rheum (7/15/2019):    prolia 60mg q6mo, next due oct 2019. Wondering if prolia contributes to chronic pain. Breast surgery (2/19/2019):    Triple negative breast cancer       Recent Labs & Imaging:     Results for Marvin Castro (MRN [id number])    Ref.  Range 1/16/2020 10:11   WBC Latest Ref Range: 4.6 - 13.2 K/uL 5.9   RBC Latest Ref Range: 4.20 - 5.30 M/uL 5.72 (H)   HGB Latest Ref Range: 12.0 - 16.0 g/dL 11.9 (L)   HCT Latest Ref Range: 35.0 - 45.0 % 38.9   MCV Latest Ref Range: 74.0 - 97.0 FL 68.0 (L)   MCH Latest Ref Range: 24.0 - 34.0 PG 20.8 (L)   MCHC Latest Ref Range: 31.0 - 37.0 g/dL 30.6 (L)   RDW Latest Ref Range: 11.6 - 14.5 % 19.8 (H)   PLATELET Latest Ref Range: 135 - 420 K/uL 340   MPV Latest Ref Range: 9.2 - 11.8 FL 10.0   NEUTROPHILS Latest Ref Range: 40 - 73 % 66   LYMPHOCYTES Latest Ref Range: 21 - 52 % 22   MONOCYTES Latest Ref Range: 3 - 10 % 9   EOSINOPHILS Latest Ref Range: 0 - 5 % 3   BASOPHILS Latest Ref Range: 0 - 2 % 0   DF Latest Units:   AUTOMATED   ABS. NEUTROPHILS Latest Ref Range: 1.8 - 8.0 K/UL 3.9   ABS. LYMPHOCYTES Latest Ref Range: 0.9 - 3.6 K/UL 1.3   ABS. MONOCYTES Latest Ref Range: 0.05 - 1.2 K/UL 0.5   ABS. EOSINOPHILS Latest Ref Range: 0.0 - 0.4 K/UL 0.2   ABS. BASOPHILS Latest Ref Range: 0.0 - 0.1 K/UL 0.0   Sodium Latest Ref Range: 136 - 145 mmol/L 139   Potassium Latest Ref Range: 3.5 - 5.5 mmol/L 3.8   Chloride Latest Ref Range: 100 - 111 mmol/L 108   CO2 Latest Ref Range: 21 - 32 mmol/L 22   Anion gap Latest Ref Range: 3.0 - 18 mmol/L 9   Glucose Latest Ref Range: 74 - 99 mg/dL 92   BUN Latest Ref Range: 7.0 - 18 MG/DL 12   Creatinine Latest Ref Range: 0.6 - 1.3 MG/DL 0.94   BUN/Creatinine ratio Latest Ref Range: 12 - 20   13   Calcium Latest Ref Range: 8.5 - 10.1 MG/DL 9.7   GFR est non-AA Latest Ref Range: >60 ml/min/1.73m2 58 (L)   GFR est AA Latest Ref Range: >60 ml/min/1.73m2 >60   Bilirubin, total Latest Ref Range: 0.2 - 1.0 MG/DL 0.3   Protein, total Latest Ref Range: 6.4 - 8.2 g/dL 7.2   Albumin Latest Ref Range: 3.4 - 5.0 g/dL 3.8   Globulin Latest Ref Range: 2.0 - 4.0 g/dL 3.4   A-G Ratio Latest Ref Range: 0.8 - 1.7   1.1   ALT (SGPT) Latest Ref Range: 13 - 56 U/L 22   AST Latest Ref Range: 10 - 38 U/L 15   Alk.  phosphatase Latest Ref Range: 45 - 117 U/L 106   Triglyceride Latest Ref Range: <150 MG/   Cholesterol, total Latest Ref Range: <200 MG/ (H)   HDL Cholesterol Latest Ref Range: 40 - 60 MG/DL 74 (H) CHOL/HDL Ratio Latest Ref Range: 0 - 5.0   3.3   VLDL, calculated Latest Units: MG/DL 22.6   LDL, calculated Latest Ref Range: 0 - 100 MG/.4 (H)   Vitamin B12 Latest Ref Range: 211 - 911 pg/mL 710   Folate Latest Ref Range: 3.10 - 17.50 ng/mL 19.6 (H)   Hemoglobin A1c, (calculated) Latest Ref Range: 4.2 - 5.6 % 6.4 (H)   Est. average glucose Latest Units: mg/dL 137   Iron Latest Ref Range: 50 - 175 ug/dL 50   TIBC Latest Ref Range: 250 - 450 ug/dL 354   Iron % saturation Latest Units: % 14   Ferritin Latest Ref Range: 8 - 388 NG/ML 28   T4, Free Latest Ref Range: 0.7 - 1.5 NG/DL 1.0   TSH Latest Ref Range: 0.36 - 3.74 uIU/mL 1.42   Vitamin D 25-Hydroxy Latest Ref Range: 30 - 100 ng/mL 41.8     MRI L-spine w/wo contrast (12/20/2019):    1. Postsurgical changes of laminectomy and fusion L3-S1 with recent removal of pedicular screws in September 2019.  -No evidence of discitis.  -No epidural collection.  -Posterior paraspinal postsurgical soft tissue changes with mixed soft tissue edema and fibrosis/granulation tissue. Superficial postsurgical fluid collection along the incision site 5 x 1.2 x 2 cm, favoring sterile collection but cannot exclude superinfected collection in the setting of concern of infection and within short postoperative interval.  2. Multilevel degenerative changes as discussed  -Moderate central canal and foraminal stenosis at L2-3. MRI T-spine w/wo contrast (12/20/2019):    1. No evidence of infection in the thoracic spine. 2. Scoliosis and multilevel degenerative changes and disc herniations without significant central canal stenosis. Multiple foraminal stenosis most pronounced at left T10-11 neural foramen. 3. Incidental findings:  -Right thyroid nodule 1 cm  -Right lateral chest wall lipoma 3 cm. CT abd/pelvis without contrast (12/20/2019):    1.  Interval removal of lumbar fusion hardware from L3-S1 with decrease in size of the ill-defined soft tissue overlying the lower surgical site, now without discrete fluid collection.  -Interbody disc spacers are again seen. 2. No evidence of bowel obstruction. 3. Significantly atrophic native kidneys with right lower quadrant transplant.  -Several punctate nonobstructing nephroliths and mild caliectasis of the transplant. 4. Cholelithiasis or gallbladder sludge.   5. Hyperdense material in the distal stomach/proximal duodenum may reflect ingested material.

## 2020-01-26 PROBLEM — Z79.01 CHRONIC ANTICOAGULATION: Status: ACTIVE | Noted: 2017-05-02

## 2020-01-26 NOTE — PROGRESS NOTES
Date of Service:  2020   Patient Name:  Wiliam Alonso   Patient :  1945       This is the Subsequent Medicare Annual Wellness Exam, performed 12 months or more after the Initial AWV or the last Subsequent AWV     I have reviewed the patient's medical history in detail and updated the computerized patient record. History     Past Medical History:   Diagnosis Date    Arthropathy     Atrophic vaginitis     Bladder tumor     Coronary artery disease     Crohn disease ([address]     DJD (degenerative joint disease)     Esophageal reflux     Female cystocele     Fibrocystic breast     Fibromyalgia     GERD (gastroesophageal reflux disease)     History of kidney transplant     IBS (irritable bowel syndrome)     Lower urinary tract symptoms (LUTS)     Nephrogenic adenoma of bladder     Nocturia     Osteopenia     Pelvic organ prolapse quantification stage 1 cystocele     Renal failure     Shingles     Sjogren's syndrome ([address]     Sleep apnea       Past Surgical History:   Procedure Laterality Date    HX BACK SURGERY  2014    HX BACK SURGERY  2019    hardware removed    HX BREAST LUMPECTOMY      HX CERVICAL FUSION      HX COLECTOMY  1984    HX CORONARY STENT PLACEMENT  5623;6877    HX HEART CATHETERIZATION  2008    HX HEMORRHOIDECTOMY      HX KNEE REPLACEMENT      HX KNEE REPLACEMENT  3/2015    left    HX LIPECTOMY      HX MOHS PROCEDURES      HX PARATHYROIDECTOMY      HX RENAL TRANSPLANT      HX TUBAL LIGATION       Current Outpatient Medications   Medication Sig Dispense Refill    ondansetron hcl (ZOFRAN) 4 mg tablet Take 4 mg by mouth every six to eight (6-8) hours as needed.  traMADol (ULTRAM-ER) 100 mg Tb24 Take 100 mg by mouth daily.  acetaminophen/diphenhydramine (TYLENOL PM EXTRA STRENGTH PO) Take 1 Tab by mouth nightly as needed.  carboxymethylcellulose sodium (REFRESH OP) Apply  to eye.       ciprofloxacin HCl (CIPRO) 500 mg tablet Take 500 mg by mouth two (2) times a day.  carboxymethylcellulose sodium (CELLUVISC) 0.5 % drop ophthalmic solution Administer 1 Drop to both eyes two (2) times a day.  cholecalciferol (VITAMIN D3) (1000 Units /25 mcg) tablet Take 7,000 Units by mouth every three (3) days.  denosumab (PROLIA) 60 mg/mL injection 60 mg by SubCUTAneous route every 6 months.  dilTIAZem SR (CARDIZEM SR) 60 mg SR capsule Take 1 Cap by mouth two (2) times a day.  esomeprazole (NEXIUM) 40 mg capsule Take 1 Cap by mouth two (2) times a day.  krill-om-3-dha-epa-phospho-ast (KRILL OIL) 1,748-192-94-80 mg cap Take 1,000 mg by mouth two (2) times a day.  methylcellulose (CITRUCEL) 500 mg tablet Take 500 mg by mouth daily.  metoprolol tartrate (LOPRESSOR) 50 mg tablet Take 50 mg by mouth two (2) times a day.  pravastatin (PRAVACHOL) 20 mg tablet Take 1 Tab by mouth daily. 0    furosemide (LASIX) 40 mg tablet Take 40 mg by mouth daily as needed.  losartan (COZAAR) 100 mg tablet Take 100 mg by mouth nightly.  multivit-min/FA/lutein/zeaxant (MACULAR VITAMIN PO) Take 1 Tab by mouth daily.  Lactobacillus acidophilus (PROBIOTIC PO) Take 1 Cap by mouth daily.  warfarin (COUMADIN) 5 mg tablet Take  by mouth daily. 7.5 mg every day except on Wednesday & Friday take 10 mg      ketotifen (ZADITOR) 0.025 % (0.035 %) ophthalmic solution Administer 1 Drop to both eyes two (2) times a day.  acyclovir (ZOVIRAX) 5 % ointment Apply 1.5 Each to affected area five (5) times daily. As needed      magnesium oxide (MAG-OX) 400 mg tablet Take 800 mg by mouth two (2) times a day.  potassium chloride (K-DUR, KLOR-CON) 20 mEq tablet Take 20 mEq by mouth daily.  predniSONE (DELTASONE) 5 mg tablet Take 5 mg by mouth daily.  mycophenolate sodium (MYFORTIC) 360 mg delayed release tablet Take 720 mg by mouth two (2) times a day.       cefTRIAXone (ROCEPHIN) 2 gram/100 mL IVPB 2 g by IntraVENous route every twenty-four (24) hours.  ALPRAZolam (XANAX) 0.5 mg tablet Take 0.5 mg by mouth two (2) times daily as needed.        Allergies   Allergen Reactions    Atenolol Myalgia    Atorvastatin Myalgia     Other reaction(s): Unknown    Azathioprine Other (comments)     Other reaction(s): Unknown    Iodinated Contrast Media Other (comments)     Had kidney transplant      Ketorolac Tromethamine Other (comments) and Itching     Family History   Problem Relation Age of Onset    Ovarian Cancer Mother     Diabetes Sister     Hypertension Sister     Diabetes Brother     Hypertension Brother     Hypertension Brother     Diabetes Sister      Social History     Tobacco Use    Smoking status: Former Smoker    Smokeless tobacco: Never Used   Substance Use Topics    Alcohol use: No     Alcohol/week: 0.0 standard drinks     Patient Active Problem List   Diagnosis Code    Bladder tumor D49.4    Female cystocele N81.10    Cervical spondylosis without myelopathy M47.812    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    Dyslipidemia E78.5    Essential hypertension I10    Immunosuppressed status ([facility] D89.9    Long term current use of systemic steroids Z79.52    Atrial fibrillation with RVR (HCC) I48.91    Prediabetes R73.03    Renal transplant disorder T86.10    Sjogren's syndrome ([facility] M35.00    Stented coronary artery Z95.5    Thoracic spondylosis without myelopathy M47.814       Depression Risk Factor Screening:     3 most recent PHQ Screens 1/23/2020   Little interest or pleasure in doing things Not at all   Feeling down, depressed, irritable, or hopeless Not at all   Total Score PHQ 2 0       Alcohol Risk Factor Screening:     Alcohol Risk Factor Screening:   Do you average 1 drink per night or more than 7 drinks a week:  No    On any one occasion in the past three months have you have had more than 3 drinks containing alcohol: No    Functional Ability and Level of Safety:   Hearing Loss  Hearing is good. Activities of Daily Living  The home contains: handrails and grab bars  Patient needs help with:  walking    Fall Risk  Fall Risk Assessment, last 12 mths 1/23/2020   Able to walk? Yes   Fall in past 12 months? No       Abuse Screen  Patient is not abused    Review of Systems   A comprehensive review of systems was negative except for that written in the separate problem-oriented documentation.     Physical Examination     VS:   Visit Vitals  /82 (BP 1 Location: Left arm, BP Patient Position: Sitting) Comment: manual   Pulse (!) 58   Temp 97.6 °F (36.4 °C) (Oral)   Resp 16   Ht 5' 4\" (1.626 m)   Wt 163 lb 12.8 oz (74.3 kg)   SpO2 99%   BMI 28.12 kg/m²        Hearing Screening    125Hz 250Hz 500Hz 1000Hz 2000Hz 3000Hz 4000Hz 6000Hz 8000Hz   Right ear:   40 40 40  40     Left ear:   40 0 40  40        Visual Acuity Screening    Right eye Left eye Both eyes   Without correction: 20/20 20/20 20/15   With correction:           Evaluation of Cognitive Function:  Mood/affect:  neutral  Appearance: age appropriate  Has your family/caregiver stated any concerns about your memory: no  Normal      Patient Care Team   Patient Care Team:  Mayur aFrley MD as PCP - General (Family Practice)  Mayur Farley MD as PCP - [facility]  Valeri Rios MD (Cardiology)  Jose Mitchell MD (Neurosurgery)  Danita Farmer MD (Hematology and Oncology)  Jennifer Dawn MD (Infectious Diseases)  Milton Rob MD (Nephrology)  Haidee Bence, MD (Breast Surgery)  Tsosie Prader, MD (Physical Medicine and Rehab)  Corby Sinclair MD (Psychiatry)  Irvin Ruiz MD (Rheumatology)  Charlie Bhakta MD (Obstetrics & Gynecology)  José Hutton MD (Radiation Oncology)      Advice/Counseling   Education and counseling provided:  Are appropriate based on today's review and evaluation  End-of-Life planning (with patient's consent)  Pneumococcal Vaccine  Influenza Vaccine  Screening Mammography  Colorectal cancer screening tests  Cardiovascular screening blood test  Bone mass measurement (DEXA)  Screening for glaucoma  Diabetes screening test      Assessment/Plan   Diagnoses and all orders for this visit:    1. Medicare annual wellness visit, subsequent    2.  Screening for alcoholism  -     [place] ANNUAL ALCOHOL SCREEN 15 MIN    3. Screening for depression  -     [place] Maintenance Due   Topic Date Due    Hepatitis C Screening  1945    DTaP/Tdap/Td series (1 - Tdap) 04/06/1957    Shingrix Vaccine Age 50> (1 of 2) 04/06/1996    BREAST CANCER SCRN MAMMOGRAM  04/06/1996    FOBT Q 1 YEAR AGE 50-75  04/06/1996    GLAUCOMA SCREENING Q2Y  04/06/2011    Bone Densitometry (Dexa) Screening  04/06/2011    Pneumococcal 65+ years (1 of 1 - PPSV23) 04/06/2011         Diogo Armando was provided a written 5-year personalized preventive services plan, which was included in her AVS.    Ashia Olivas MD  Internal Medicine, [address] [place] [address]

## 2020-02-05 PROBLEM — M81.8 OTHER OSTEOPOROSIS WITHOUT CURRENT PATHOLOGICAL FRACTURE: Status: ACTIVE | Noted: 2020-02-05

## 2020-02-05 PROBLEM — B37.81 CANDIDA ESOPHAGITIS (HCC): Status: ACTIVE | Noted: 2020-02-05

## 2020-02-24 ENCOUNTER — OFFICE VISIT (OUTPATIENT)
Dept: FAMILY MEDICINE CLINIC | Age: 75
End: 2020-02-24

## 2020-02-24 VITALS
WEIGHT: 168 LBS | SYSTOLIC BLOOD PRESSURE: 150 MMHG | TEMPERATURE: 98 F | OXYGEN SATURATION: 98 % | HEIGHT: 64 IN | DIASTOLIC BLOOD PRESSURE: 86 MMHG | RESPIRATION RATE: 20 BRPM | BODY MASS INDEX: 28.68 KG/M2 | HEART RATE: 60 BPM

## 2020-02-24 DIAGNOSIS — K50.10 CROHN'S DISEASE OF LARGE INTESTINE WITHOUT COMPLICATION (HCC): ICD-10-CM

## 2020-02-24 DIAGNOSIS — I25.10 CORONARY ARTERY DISEASE INVOLVING NATIVE CORONARY ARTERY OF NATIVE HEART WITHOUT ANGINA PECTORIS: ICD-10-CM

## 2020-02-24 DIAGNOSIS — M35.00 SJOGREN'S SYNDROME, WITH UNSPECIFIED ORGAN INVOLVEMENT (HCC): ICD-10-CM

## 2020-02-24 DIAGNOSIS — M16.11 PRIMARY OSTEOARTHRITIS OF RIGHT HIP: ICD-10-CM

## 2020-02-24 DIAGNOSIS — Z79.01 CHRONIC ANTICOAGULATION: ICD-10-CM

## 2020-02-24 DIAGNOSIS — M81.8 OTHER OSTEOPOROSIS WITHOUT CURRENT PATHOLOGICAL FRACTURE: ICD-10-CM

## 2020-02-24 DIAGNOSIS — I48.91 ATRIAL FIBRILLATION WITH RVR (HCC): ICD-10-CM

## 2020-02-24 DIAGNOSIS — T86.10 RENAL TRANSPLANT DISORDER: ICD-10-CM

## 2020-02-24 DIAGNOSIS — I10 ESSENTIAL HYPERTENSION: ICD-10-CM

## 2020-02-24 DIAGNOSIS — D84.9 IMMUNOSUPPRESSED STATUS (HCC): Primary | ICD-10-CM

## 2020-02-24 RX ORDER — PROMETHAZINE HYDROCHLORIDE 25 MG/1
25 TABLET ORAL
COMMUNITY
End: 2020-06-23

## 2020-02-24 RX ORDER — METOPROLOL TARTRATE 50 MG/1
75 TABLET ORAL 2 TIMES DAILY
Qty: 90 TAB | Refills: 0
Start: 2020-02-24 | End: 2020-06-28

## 2020-02-24 NOTE — PROGRESS NOTES
[address] [facility]  Perioperative Risk Assessment    Patient:  Sarita Zhang  Patient's :  1945  Referring Physician:  Dr. Chava Asher  Today's Date: 2020    Assessment:       ICD-10-CM ICD-9-CM   1. Immunosuppressed status ([facility] D89.9 279.9   2. Renal transplant disorder T86.10 996.81   3. Atrial fibrillation with RVR (HCC) I48.91 427.31   4. Sjogren's syndrome, with unspecified organ involvement ([facility] M35.00 710.2   5. Coronary artery disease involving native coronary artery of native heart without angina pectoris I25.10 414.01   6. Essential hypertension I10 401.9   7. Chronic anticoagulation Z79.01 V58.61   8. Primary osteoarthritis of right hip M16.11 715.15   9. Crohn's disease of large intestine without complication ([address] C03.66 555.1   10. Other osteoporosis without current pathological fracture M81.8 733.09       Plan:     The risk of of cardiac complication based on your patient's personal Revised Cardiac Risk Index is:    Patient is at medium risk (2-5%) for non-cardiac surgery however, risk cannot be further minimized. The following recommendations were made for medication regimen prior to surgery:    Stop coumadin 4 days prior to surgery, per cardiology. (If needed) will defer perioperative antibiotic prophylaxis recs to Infectious Disease, Dr. Jonatan Rubin. Management plan & patient instructions reviewed with Sarita Zhang, who voiced understanding.     [ additional problem-oriented Assessment / Plan documentation at end of note ]    Kearney Primrose, MD  Internal Medicine, Family Medicine & Sports Medicine  2020, 9:38 AM  [place] [address] [address], [address], [address] [place] [address]  Phone [phone]  Fax [phone]  [facility]    Faxed to [phone] on 2020 @ 12:50pm.    Subjective:   (4+ elements)     History of Present Illness:  Sarita Zhang is a 69 y.o. female who I was asked to evaluate for perioperative risk assessment. Planned date of surgery: 03/10/2020  Planned surgical procedure: right anterior hip arthroplasty  Surgeon: Dr. Obdulia Villatoro  Type of anesthesia: general    Evaluation of major predictors of cardiac complications:   The nature of the surgery is: MEDIUM   Patient has no history of angina, anginal equivalents, diabetes requiring treatment with insulin, heart failure, cerebrovascular disease, preoperative serum creatinine > 2.0.     Patient DOES have history of ischemic heart disease     On cipro 500mg BID from Dr. Triny Canales since 1/20/2020 for chronic suppression. Has appt with Dr. Alycia Pearl this afternoon. Has upcoming appt with nephrology on Thursday 2/27/2020. Always in pain from RIGHT hip currently. Notes increase in metoprolol from her higher BP \"which I know is because I'm hurting all the time\".       PMH, PSH, Social Hx, Family Hx, Meds and Allergies     Past Medical History:   Diagnosis Date    Arthropathy     Atrophic vaginitis     Bladder tumor     Coronary artery disease     Crohn disease ([address]     DJD (degenerative joint disease)     Esophageal reflux     Female cystocele     Fibrocystic breast     Fibromyalgia     GERD (gastroesophageal reflux disease)     History of kidney transplant     IBS (irritable bowel syndrome)     Lower urinary tract symptoms (LUTS)     Nephrogenic adenoma of bladder     Nocturia     Osteopenia     Pelvic organ prolapse quantification stage 1 cystocele     Renal failure     Shingles     Sjogren's syndrome ([address]     Sleep apnea       Past Surgical History:   Procedure Laterality Date    HX BACK SURGERY  4/2014    HX BACK SURGERY  09/2019    hardware removed    HX BREAST LUMPECTOMY  1998    HX CERVICAL FUSION  2007    HX COLECTOMY  1984    HX CORONARY STENT PLACEMENT  9688;3468    HX HEART CATHETERIZATION  1/2008    HX HEMORRHOIDECTOMY  1993    HX KNEE REPLACEMENT  2008    HX KNEE REPLACEMENT  3/2015    left  HX LIPECTOMY      HX MOHS PROCEDURES      HX PARATHYROIDECTOMY      HX RENAL TRANSPLANT      HX TUBAL LIGATION        Social History     Socioeconomic History    Marital status: UNKNOWN     Spouse name: Not on file    Number of children: Not on file    Years of education: Not on file    Highest education level: Not on file   Occupational History    Not on file   Social Needs    Financial resource strain: Not on file    Food insecurity:     Worry: Not on file     Inability: Not on file    Transportation needs:     Medical: Not on file     Non-medical: Not on file   Tobacco Use    Smoking status: Former Smoker     Start date:      Last attempt to quit: 2000     Years since quittin.9    Smokeless tobacco: Never Used   Substance and Sexual Activity    Alcohol use: No     Alcohol/week: 0.0 standard drinks    Drug use: No    Sexual activity: Not on file   Lifestyle    Physical activity:     Days per week: Not on file     Minutes per session: Not on file    Stress: Not on file   Relationships    Social connections:     Talks on phone: Not on file     Gets together: Not on file     Attends Roman [employer] service: Not on file     Active member of club or organization: Not on file     Attends meetings of clubs or organizations: Not on file     Relationship status: Not on file    Intimate partner violence:     Fear of current or ex partner: Not on file     Emotionally abused: Not on file     Physically abused: Not on file     Forced sexual activity: Not on file   Other Topics Concern    Not on file   Social History Narrative    Oct 2019:  x 38 years. Used to work for [employer]. Received kidney transplant in .      Family History   Problem Relation Age of Onset    Ovarian Cancer Mother     Diabetes Sister     Hypertension Sister     Diabetes Brother     Hypertension Brother     Hypertension Brother     Diabetes Sister      Current Outpatient Medications on File Prior to Visit   Medication Sig Dispense Refill    promethazine (PHENERGAN) 25 mg tablet Take 25 mg by mouth every six (6) hours as needed for Nausea.  ondansetron hcl (ZOFRAN) 4 mg tablet Take 4 mg by mouth every six to eight (6-8) hours as needed.  traMADol (ULTRAM-ER) 100 mg Tb24 Take 100 mg by mouth daily.  acetaminophen/diphenhydramine (TYLENOL PM EXTRA STRENGTH PO) Take 1 Tab by mouth nightly as needed.  carboxymethylcellulose sodium (REFRESH OP) Apply  to eye.  ciprofloxacin HCl (CIPRO) 500 mg tablet Take 500 mg by mouth two (2) times a day.  cholecalciferol (VITAMIN D3) (1000 Units /25 mcg) tablet Take 7,000 Units by mouth every three (3) days.  denosumab (PROLIA) 60 mg/mL injection 60 mg by SubCUTAneous route every 6 months.  dilTIAZem SR (CARDIZEM SR) 60 mg SR capsule Take 1 Cap by mouth two (2) times a day.  esomeprazole (NEXIUM) 40 mg capsule Take 1 Cap by mouth two (2) times a day.  krill-om-3-dha-epa-phospho-ast (KRILL OIL) 1,696-725-34-80 mg cap Take 1,000 mg by mouth two (2) times a day.  methylcellulose (CITRUCEL) 500 mg tablet Take 500 mg by mouth daily.  pravastatin (PRAVACHOL) 20 mg tablet Take 1 Tab by mouth daily. 0    furosemide (LASIX) 40 mg tablet Take 40 mg by mouth daily as needed.  losartan (COZAAR) 100 mg tablet Take 100 mg by mouth nightly.  multivit-min/FA/lutein/zeaxant (MACULAR VITAMIN PO) Take 1 Tab by mouth daily.  Lactobacillus acidophilus (PROBIOTIC PO) Take 1 Cap by mouth daily.  warfarin (COUMADIN) 5 mg tablet Take  by mouth daily. 7.5 mg every day except on Wednesday & Friday take 10 mg      ketotifen (ZADITOR) 0.025 % (0.035 %) ophthalmic solution Administer 1 Drop to both eyes two (2) times a day.  acyclovir (ZOVIRAX) 5 % ointment Apply 1.5 Each to affected area five (5) times daily.  As needed      magnesium oxide (MAG-OX) 400 mg tablet Take 800 mg by mouth two (2) times a day.  potassium chloride (K-DUR, KLOR-CON) 20 mEq tablet Take 20 mEq by mouth daily.  predniSONE (DELTASONE) 5 mg tablet Take 5 mg by mouth daily.  mycophenolate sodium (MYFORTIC) 360 mg delayed release tablet Take 720 mg by mouth two (2) times a day.  carboxymethylcellulose sodium (CELLUVISC) 0.5 % drop ophthalmic solution Administer 1 Drop to both eyes two (2) times a day.  [DISCONTINUED] metoprolol tartrate (LOPRESSOR) 50 mg tablet Take 50 mg by mouth two (2) times a day.  [DISCONTINUED] cefTRIAXone (ROCEPHIN) 2 gram/100 mL IVPB 2 g by IntraVENous route every twenty-four (24) hours.  ALPRAZolam (XANAX) 0.5 mg tablet Take 0.5 mg by mouth two (2) times daily as needed. No current facility-administered medications on file prior to visit.        Allergies   Allergen Reactions    Atenolol Myalgia    Atorvastatin Myalgia     Other reaction(s): Unknown    Azathioprine Other (comments)     Other reaction(s): Unknown    Iodinated Contrast Media Other (comments)     Had kidney transplant      Ketorolac Tromethamine Other (comments) and Itching       Review of Systems:  (10+ elements)     Constitutional: negative for chills, fever, malaise/fatigue, night sweats, weakness and weight loss   Skin: negative for itching, rash, and blistering   HENT: negative for ear discharge, headaches, hearing loss, nose bleeds, sore throat, toothache, and mouth sores   Eyes: negative for blurred vision, double vision, eye discharge, eye pain, eye redness and photophobia   Cardiovascular: negative for chest pain, chest pressure, palpitations, orthopnea, PND, and leg swelling   Respiratory: negative for cough, sputum production, hemoptysis, shortness of breath, and wheezing   Gastointestinal: negative for nausea, vomiting, abdominal pain, heartburn, diarrhea, constipation, blood in stool, tarry black stools, and decreased appetite   Genitourinary: Negative for dysuria, hematuria, polyuria, nocturia, difficulty passing urine, and incontinence   Musculoskeletal: negative for back pain, and myalgias; POSITIVE for joint pain & weakness   Endo: negative for cold intolerance, heat intolerance, polydipsia and polyuria. Heme: negative for easy bleeding, easy bruising and bleeding gums   Allergies: negative for environmental allergies, hives, rhinorrhea, nasal congestion and itchy eyes   Neurological: negative for dizziness, focal weakness, level of consciousness, seizures, speech change and tingling   Psychiatric:  negative for depression, anxiety, hallucinations, memory loss, substance abuse, suicidal ideations, homicidal ideations       Objective:  (FULL exam)     VS:    Visit Vitals  /86 (BP 1 Location: Left arm, BP Patient Position: Sitting) Comment: manual   Pulse 60   Temp 98 °F (36.7 °C) (Oral)   Resp 20   Ht 5' 4\" (1.626 m)   Wt 168 lb (76.2 kg)   SpO2 98%   BMI 28.84 kg/m²     Physical Exam  Nursing note reviewed. Constitutional:       Appearance: She is well-developed. She is not diaphoretic. Comments: overweight   HENT:      Head: Normocephalic and atraumatic. Neck:      Musculoskeletal: Neck supple. Thyroid: No thyromegaly. Cardiovascular:      Rate and Rhythm: Normal rate and regular rhythm. Heart sounds: No murmur. Pulmonary:      Effort: Pulmonary effort is normal.      Breath sounds: Normal breath sounds. No wheezing or rales. Abdominal:       Musculoskeletal:        Back:    Skin:     General: Skin is warm and dry. Neurological:      Mental Status: She is alert and oriented to person, place, and time. Cranial Nerves: No cranial nerve deficit. Coordination: Coordination normal.      Gait: Gait abnormal (using rolling walker 2/2 RIGHT hip pain). Psychiatric:         Behavior: Behavior normal.         Thought Content:  Thought content normal.         Judgment: Judgment normal.           Records Review:     Cardiology (2/13/2020):  Assessment & Plan:  1. Preop cardiovascular exam  Denies angina, dyspnea  No s/s of CHF  Limited exertional activity d/t R hip pain  Had back surgery 9/2019 -- had post op afib RVR 9/7/19 and converted to SR on Dilt gtt  Denies h/o CVA. Discussed with Dr. Travon Fair, will need to hold Coumadin for 4 days prior to surgery. RN will send Coumadin clinic daisha-procedure notice to check ASAP post surgery  Patient is at increased risk for non-cardiac surgery, however, it is felt that the risk cannot be further minimized. No further cardiac testing is needed. 2. Paroxysmal atrial fibrillation ([address]  3. Chronic anticoagulation  Denies recurrence since her last episode of post op afib 9/2019  EKG today -- SR, PVC  Continue Diltiazem, Lopressor  Continue Coumadin, INRs per Coumadin clinic    4. Coronary artery disease involving native coronary artery of native heart without angina pectoris  Denies angina, dyspnea  Continue Lopressor, Pravachol  Not on ASA as patient is on Coumadin for afib  Encouraged patient to have a healthy, low-salt diet and participate in routine physical activity as able to    5. Essential hypertension  Elevated today, likely from pain  Increase Lopressor to 75mg BID  Continue Diltiazem, Cozaar, Lasix  Encouraged BP monitoring at home    6. Dyslipidemia  Continue Pravachol  Labs and management per Rin Hahn MD    Problem List:  1. Atrial fibrillation  1. Recurrent post op with RVR 9/7/19 - converted to SR with Cardizem gtt.  Symptomatic with palpitations/CP  2. [address] on hold for recent surgery  3. Noted on 11/30/16 (strip scanned in media)  4. Post operatively after lumbar surgery 11/21/16    5. CHADS-VASC 6 (HTN, F, Age, DVT, CAD)  6. ZIO patch 1/12/17 - SVT, atrial tach and PAF (asymptomatic)  7. Recurrence during colonoscopy 7/11/19 -- All notes mention regular rate and rhythm with normal vital signs during procedure except one note that reports  but no other documentation available for review  2.  L/S hardware infection   1. S/p removal 9/4/19  2. PICC 1031/19: Receiving IV antibiotic's. 3. CAD  1. UA s/p [facility] 2/2005 - PCI to LAD using a SARIAH  2. Staged PCI 3/2005 - staged PCI of LCx using a SARIAH  3. [facility] 1/30/08 - new 90% distal OM1 s/p PCI using a Taxus 2.5 x 16 mm SARIAH. Residual disease with diffuse disease of the mid RCA with a discrete 50% stenosis as well as very diffuse distal LAD disease at the apex. The previously implanted stents were widely patent with no restenosis.    4. NST normal 6/13/08, 3/14/2011, 3/11/14 normal perfusion, normal LV EF on gated imaging. 5. NST 3/5/18 - EF 66%, no reversible ischemia or infarct  4. Normal left ventricular function  1. Echo 12/1/16 - EF 89%, mild diastolic dysfunction, dilated RV, PAP 17mmHg  2. Echo 9/9/19 - EF 70%, no sig valvular pathology  5. S/p renal transplant in 7/1992 with essentially normal renal function. 6. Hypertension  7. Dyslipidemia  8. Sleep apnea  9. Sjögrens syndrome. 10. Asthma. 11. Fibromyalgia. 12. Chronic constipation and IBS  13. Anxiety. 14. Polypharmacy.    15. S/p L shoulder surgery 11/7/06 and 2007  16. Cervical spine C4-C5, C5-C6 surgery in 2007.    1. Removal of L4-5 Coflex, L3-4, Re-do L4-5, L5-S1 Decompression, L3-4, L4-5 & L5-S1 PLIF, L3-4, L4-5, L5-S1 PL arthrodesis and pedicle screw fixation 11/21/16.    17. S/p L TKA    18. Lower extremity DVT 3/2011, tx with 3 months of Coumadin  19. H/o tobacco abuse -- quit 2000    Follow will be scheduled in: 6mos     Infectious disease (2/18/2020):  Chart review. \"patient is cleared for needed hip joint replacement surgery from the ID standpoint\". Labwork and Ancillary Studies     Results for Severa Leaven (MRN [id number])    Ref.  Range 2/18/2020 14:41   WBC x 10*3 Latest Ref Range: 4.0 - 11.0 K/uL 7.6   RBC x 10*6 Latest Ref Range: 3.80 - 5.20 M/uL 5.54 (H)   HGB Latest Ref Range: 11.7 - 16.1 g/dL 11.8   HCT Latest Ref Range: 35.1 - 48.3 % 39.3   MCV Latest Ref Range: 81 - 99 fL 71 (L)   MCH Latest Ref Range: 26 - 34 pg 21 (L)   MCHC Latest Ref Range: 31 - 36 g/dL 30 (L)   RDW Latest Ref Range: 10.0 - 15.5 % 21.2 (H)   Platelet Latest Ref Range: 140 - 440 K/uL 353   MPV Latest Ref Range: 9.0 - 13.0 fL 9.5   Segmented Neutrophils Latest Ref Range: 40 - 75 % 83 (H)   Lymphocytes Latest Ref Range: 20 - 45 % 11 (L)   Monocytes Latest Ref Range: 3 - 12 % 5   Eosinophil Latest Ref Range: 0 - 6 % 1   Basophils Latest Ref Range: 0 - 2 % 1   ABSOLUTE NEUTROPHILS Latest Ref Range: 1.8 - 7.7 K/uL 6.3   ABSOLUTE LYMPHOCYTES Latest Ref Range: 1.0 - 4.8 K/uL 0.8 (L)   ABSOLUTE MONOCYTE COUNT Latest Ref Range: 0.1 - 1.0 K/uL 0.4   ABSOLUTE EOSINOPHIL Latest Ref Range: 0.0 - 0.5 K/uL 0.1   ABSOLUTE BASOPHIL COUNT Latest Ref Range: 0.0 - 0.2 K/uL 0.0   ANISOCYTOSIS Latest Ref Range: (none)  2+ (A)   MICROCYTES Latest Ref Range: (none)  1+ (A)   HYPOCHROMASIA Latest Ref Range: (none)  1+ (A)   POLYCHROMASIA Latest Ref Range: (none)  1+ (A)   RBC FRAGMENTS Latest Ref Range: (none)  Occasional (A)   ACANTHOCYTES Latest Ref Range: (none)  1+ (A)   ELLIPTOCYTES Latest Ref Range: (none)  1+ (A)   SMEAR EVALUATION Unknown Platelet morphology appears normal.   Sedimentation Rate Latest Ref Range: 0 - 30 mm/hr 15   aPTT Latest Ref Range: 22 - 36 sec 38 (H)   INR Latest Ref Range: 0.89 - 1.29  2.23 (H)   Protime Latest Ref Range: 9.0 - 13.0 sec 22.4 (H)   Glucose  Latest Ref Range: 70 - 99 mg/dL 106 (H)   BUN Latest Ref Range: 6 - 22 mg/dL 13   CREATININE Latest Ref Range: 0.8 - 1.4 mg/dL 0.7 (L)   eGFR African American Latest Ref Range: >60.0  >60.0   eGFR Non African American Latest Ref Range: >60.0  >60.0   Sodium Latest Ref Range: 133 - 145 mmol/L 138   Potassium Latest Ref Range: 3.5 - 5.5 mmol/L 3.9   Chloride Latest Ref Range: 98 - 110 mmol/L 102   CO2 Latest Ref Range: 20 - 32 mmol/L 21   SGOT (AST) Latest Ref Range: 10 - 37 U/L 23   SGPT (ALT) Latest Ref Range: 5 - 40 U/L 21   Alkaline Phosphatase Latest Ref Range: 40 - 120 U/L 91   Bilirubin Total Latest Ref Range: 0.2 - 1.2 mg/dL 0.2   Total Protein Latest Ref Range: 6.2 - 8.1 g/dL 7.0   Albumin Latest Ref Range: 3.5 - 5.0 g/dL 4.2   Globulin Latest Ref Range: 2.0 - 4.0 g/dL 2.8   A/G Ratio Latest Ref Range: 1.1 - 2.6 ratio 1.5   Calcium Latest Ref Range: 8.4 - 10.5 mg/dL 9.6   ANION GAP Latest Units: mmol/L 14.6   Urine Specific Gravity Latest Ref Range: 1.005 - 1.030  1.014   Urine pH Latest Ref Range: 5.0 - 8.0 pH 6.0   Urine Protein Screen Latest Ref Range: Negative, Trace mg/dL Negative   Urine Glucose Latest Ref Range: Negative mg/dL Negative   Urine Ketones Latest Ref Range: Negative, NOT TESTED, Color Interference, Mucoid Interference mg/dL Negative   Urine Bilirubin Latest Ref Range: Negative  Negative   Urine Blood Latest Ref Range: Negative  Small (A)   Urine Nitrite Latest Ref Range: Negative  Negative   Urine Leukocyte Esterase Latest Ref Range: Negative  Negative   Urine Urobilinogen Latest Ref Range: <2.0 mg/dL <2.0   Urine WBC Latest Ref Range: 0 - 2 /hpf 0-2   Urine RBC Latest Ref Range: Negative, 0-2 /hpf 0-2   Hyaline Cast Latest Ref Range: Negative, 10-20, 20-50 /lpf Negative   Squamous Epithelial Cells Latest Ref Range: 0 - 2 /hpf 0-2          [additional Problem-Based Documentation ]     HTN   Not well controlled likely 2/2 pain  No change to current regimen  Key CAD CHF Meds             metoprolol tartrate (LOPRESSOR) 50 mg tablet (Taking) Take 1.5 Tabs by mouth two (2) times a day. dilTIAZem SR (CARDIZEM SR) 60 mg SR capsule (Taking) Take 1 Cap by mouth two (2) times a day. krill-om-3-dha-epa-phospho-ast (KRILL OIL) 1,367-108-38-80 mg cap (Taking) Take 1,000 mg by mouth two (2) times a day. pravastatin (PRAVACHOL) 20 mg tablet (Taking) Take 1 Tab by mouth daily. furosemide (LASIX) 40 mg tablet (Taking) Take 40 mg by mouth daily as needed. losartan (COZAAR) 100 mg tablet (Taking) Take 100 mg by mouth nightly.     warfarin (COUMADIN) 5 mg tablet (Taking) Take  by mouth daily.  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 Eleni Cristobal

## 2020-02-24 NOTE — PATIENT INSTRUCTIONS
Per the notes from cardiology on 2/13/2020: \"Discussed with Dr. Rossana Reyes, will need to hold Coumadin for 4 days prior to surgery. \" Will be sending a copy of my note to Dr. Kumar Estimable later on today.

## 2020-02-24 NOTE — PROGRESS NOTES
Sarita Zhang is a 69 y.o. female (: 1945) presenting to address:    Chief Complaint   Patient presents with    Pre-op Exam       Vitals:    20 0919 20 0941   BP: 153/83 150/86   Pulse: 60    Resp: 20    Temp: 98 °F (36.7 °C)    TempSrc: Oral    SpO2: 98%    Weight: 168 lb (76.2 kg)    Height: 5' 4\" (1.626 m)    PainSc:   6    PainLoc: Hip        Hearing/Vision:   No exam data present    Learning Assessment:     Learning Assessment 10/23/2019   PRIMARY LEARNER Patient   HIGHEST LEVEL OF EDUCATION - PRIMARY LEARNER  2 YEARS OF COLLEGE   BARRIERS PRIMARY LEARNER NONE   CO-LEARNER CAREGIVER No   PRIMARY LANGUAGE ENGLISH   LEARNER PREFERENCE PRIMARY LISTENING   ANSWERED BY stephenie   RELATIONSHIP SELF     Depression Screening:     3 most recent PHQ Screens 2020   Little interest or pleasure in doing things Not at all   Feeling down, depressed, irritable, or hopeless Not at all   Total Score PHQ 2 0     Fall Risk Assessment:     Fall Risk Assessment, last 12 mths 2020   Able to walk? Yes   Fall in past 12 months? No     Abuse Screening:     Abuse Screening Questionnaire 2020   Do you ever feel afraid of your partner? N   Are you in a relationship with someone who physically or mentally threatens you? N   Is it safe for you to go home? Y     Coordination of Care Questionaire:   1. Have you been to the ER, urgent care clinic since your last visit? Hospitalized since your last visit? NO    2. Have you seen or consulted any other health care providers outside of the [address] [place] [address] since your last visit? Include any pap smears or colon screening. YES    Advanced Directive:   1. Do you have an Advanced Directive? NO    2. Would you like information on Advanced Directives?  NO

## 2020-06-23 ENCOUNTER — VIRTUAL VISIT (OUTPATIENT)
Dept: FAMILY MEDICINE CLINIC | Age: 75
End: 2020-06-23

## 2020-06-23 DIAGNOSIS — R20.2 NUMBNESS AND TINGLING IN BOTH HANDS: Primary | ICD-10-CM

## 2020-06-23 DIAGNOSIS — M25.511 RIGHT SHOULDER PAIN, UNSPECIFIED CHRONICITY: ICD-10-CM

## 2020-06-23 DIAGNOSIS — Z98.890 HISTORY OF NECK SURGERY: ICD-10-CM

## 2020-06-23 DIAGNOSIS — R22.0 MASS OF PALATE: ICD-10-CM

## 2020-06-23 DIAGNOSIS — F51.04 PSYCHOPHYSIOLOGICAL INSOMNIA: ICD-10-CM

## 2020-06-23 DIAGNOSIS — D84.9 IMMUNOSUPPRESSED STATUS (HCC): ICD-10-CM

## 2020-06-23 DIAGNOSIS — R79.9 ABNORMAL FINDING OF BLOOD CHEMISTRY, UNSPECIFIED: ICD-10-CM

## 2020-06-23 DIAGNOSIS — R79.89 OTHER SPECIFIED ABNORMAL FINDINGS OF BLOOD CHEMISTRY: ICD-10-CM

## 2020-06-23 DIAGNOSIS — R73.03 PREDIABETES: ICD-10-CM

## 2020-06-23 DIAGNOSIS — R20.0 NUMBNESS AND TINGLING IN BOTH HANDS: Primary | ICD-10-CM

## 2020-06-23 DIAGNOSIS — I25.10 CORONARY ARTERY DISEASE INVOLVING NATIVE CORONARY ARTERY OF NATIVE HEART WITHOUT ANGINA PECTORIS: ICD-10-CM

## 2020-06-23 DIAGNOSIS — E78.5 DYSLIPIDEMIA: ICD-10-CM

## 2020-06-23 DIAGNOSIS — I10 ESSENTIAL HYPERTENSION: ICD-10-CM

## 2020-06-23 RX ORDER — SULFAMETHOXAZOLE AND TRIMETHOPRIM 800; 160 MG/1; MG/1
1 TABLET ORAL DAILY
COMMUNITY
End: 2020-09-21

## 2020-06-23 RX ORDER — TRAZODONE HYDROCHLORIDE 50 MG/1
50 TABLET ORAL
COMMUNITY
End: 2020-06-23 | Stop reason: SDUPTHER

## 2020-06-23 RX ORDER — TRAZODONE HYDROCHLORIDE 50 MG/1
50 TABLET ORAL
Qty: 90 TAB | Refills: 1 | Status: SHIPPED | OUTPATIENT
Start: 2020-06-23 | End: 2020-09-21

## 2020-06-23 NOTE — PROGRESS NOTES
[facility]  Primary Care Office Visit - Telemedicine Problem-Oriented Note    Consent: Telma Heard, who was seen by synchronous (real-time) audio only technology, and/or her healthcare decision maker, is aware that this patient-initiated, Telehealth encounter on 6/23/2020 is a billable service, with coverage as determined by her insurance carrier. She is aware that she may receive a bill and has provided verbal consent to proceed: Yes. This service was provided through telehealth via [employer] me, both the Patient Home and [facility]. .      Assessment & Plan:       ICD-10-CM ICD-9-CM   1. Numbness and tingling in both hands R20.0 782.0    R20.2    2. History of neck surgery Z98.890 V15.29   3. Coronary artery disease involving native coronary artery of native heart without angina pectoris I25.10 414.01   4. Essential hypertension I10 401.9   5. Immunosuppressed status ([facility] [address] D89.9 279.9   6. Dyslipidemia E78.5 272.4   7. Prediabetes R73.03 790.29   8. Psychophysiological insomnia F51.04 307.42   9. Other specified abnormal findings of blood chemistry  R79.89 790.6   10. Abnormal finding of blood chemistry, unspecified  R79.9 790.6     Right-hand-dominant female with bilateral numbness and tingling in both hands, with pertinent history of cervical fusion in 2007. We will check labs as well as C-spine XR. Discussed with patient that likely will be referring her back to her neurosurgeon for evaluation, and she is agreeable to this. Hypertension, improved control with significant decrease amounts of pain following her hip replacement surgery. We will continue with the lower dose of metoprolol 50 mg twice daily. Key CAD CHF Meds             metoprolol tartrate (LOPRESSOR) 50 mg tablet (Taking) Take 1 Tab by mouth two (2) times a day. dilTIAZem SR (CARDIZEM SR) 60 mg SR capsule (Taking) Take 1 Cap by mouth two (2) times a day.     krill-om-3-dha-epa-phospho-ast (Victoria Kanner OIL) 1,822-694-11-80 mg cap (Taking) Take 1,000 mg by mouth daily. pravastatin (PRAVACHOL) 20 mg tablet (Taking) Take 1 Tab by mouth daily. Tries to do 3x/week, 2/2 myalgia    furosemide (LASIX) 40 mg tablet (Taking) Take 40 mg by mouth daily as needed. losartan (COZAAR) 100 mg tablet (Taking) Take 100 mg by mouth nightly. warfarin (COUMADIN) 5 mg tablet (Taking) Take  by mouth daily. 7.5 mg every day except on Wednesday & Friday take 10 mg        Ongoing insomnia, which she notes is well-controlled with PRN trazodone. Advised her that she could take it nightly if she chose. Orders Placed This Encounter    XR SPINE CERV W OBL/FLEX/EXT MIN 6 V COMP     Standing Status:   Future     Standing Expiration Date:   6/23/2021     Order Specific Question:   Reason for Exam     Answer:   neck pain     Order Specific Question:   Which facility to perform procedure? Answer:   [facility]    T4, FREE     Standing Status:   Future     Standing Expiration Date:   6/23/2021    TSH 3RD GENERATION     Standing Status:   Future     Standing Expiration Date:   6/23/2021    HEMOGLOBIN A1C WITH EAG     Standing Status:   Future     Standing Expiration Date:   6/23/2021    FERRITIN     Standing Status:   Future     Standing Expiration Date:   6/23/2021    IRON PROFILE     Standing Status:   Future     Standing Expiration Date:   6/23/2021    LIPID PANEL     Standing Status:   Future     Standing Expiration Date:   6/23/2021    VITAMIN B12 & FOLATE     Standing Status:   Future     Standing Expiration Date:   6/23/2021    traZODone (DESYREL) 50 mg tablet     Sig: Take 1 Tab by mouth nightly. Dispense:  90 Tab     Refill:  1    metoprolol tartrate (LOPRESSOR) 50 mg tablet     Sig: Take 1 Tab by mouth two (2) times a day.      Dispense:  180 Tab     Refill:  1          Total time on telephone: 24min      712  Subjective:   Héctor Witt is a 77 y.o. female who was seen for Numbness (both hands & right leg)    Happy that she completed her hip surgery. Still \"mending\". Notes her BP has been running much lower since she has been in less pain. Thus instead of taking 75mg metoprolol BID, she is taking 50mg BID with good control. Still not back on prolia following her hip surgery. Is now followed by Dr. Megan Antonio (ID) since [facility] practice closed. Notes some changes to ABx PPX. Main complaint is numbness and tingling in both hands. Is RHD. Has tremor that seems to be interfering with her handwriting. This started a few months ago \"but my hip was louder than these symptoms at the time\"  Has actually dropped some thing she has been trying to hold. Has had significant cervical spine surgery in the past.  Does have a neurosurgeon, however sees him \"as I need him\". Also notes she may have a \"bump or something\" on the top of the inside of her mouth. Does have a dentist whom she sees regularly, and \"I think I will be calling him for an appointment. \"        Prior to Admission medications    Medication Sig Start Date End Date Taking? Authorizing Provider   promethazine (PHENERGAN) 25 mg tablet Take 25 mg by mouth every six (6) hours as needed for Nausea. Provider, Historical   metoprolol tartrate (LOPRESSOR) 50 mg tablet Take 1.5 Tabs by mouth two (2) times a day. 2/24/20   Luz Mcneal MD   ondansetron hcl (ZOFRAN) 4 mg tablet Take 4 mg by mouth every six to eight (6-8) hours as needed. Provider, Historical   traMADol (ULTRAM-ER) 100 mg Tb24 Take 100 mg by mouth daily. 12/30/19   Provider, Historical   acetaminophen/diphenhydramine (TYLENOL PM EXTRA STRENGTH PO) Take 1 Tab by mouth nightly as needed. Provider, Historical   carboxymethylcellulose sodium (REFRESH OP) Apply  to eye. Provider, Historical   ciprofloxacin HCl (CIPRO) 500 mg tablet Take 500 mg by mouth two (2) times a day.     Provider, Historical   carboxymethylcellulose sodium (CELLUVISC) 0.5 % drop ophthalmic solution Administer 1 Drop to both eyes two (2) times a day. Provider, Historical   cholecalciferol (VITAMIN D3) (1000 Units /25 mcg) tablet Take 7,000 Units by mouth every three (3) days. Provider, Historical   denosumab (PROLIA) 60 mg/mL injection 60 mg by SubCUTAneous route every 6 months. Provider, Historical   dilTIAZem SR (CARDIZEM SR) 60 mg SR capsule Take 1 Cap by mouth two (2) times a day. 7/16/19   Provider, Historical   esomeprazole (NEXIUM) 40 mg capsule Take 1 Cap by mouth two (2) times a day. Provider, Historical   krill-om-3-dha-epa-phospho-ast (KRILL OIL) 1,829-107-26-80 mg cap Take 1,000 mg by mouth two (2) times a day. Provider, Historical   methylcellulose (CITRUCEL) 500 mg tablet Take 500 mg by mouth daily. Provider, Historical   pravastatin (PRAVACHOL) 20 mg tablet Take 1 Tab by mouth daily. 8/6/19   Provider, Historical   furosemide (LASIX) 40 mg tablet Take 40 mg by mouth daily as needed. Provider, Historical   losartan (COZAAR) 100 mg tablet Take 100 mg by mouth nightly. 7/24/19   Provider, Historical   multivit-min/FA/lutein/zeaxant (MACULAR VITAMIN PO) Take 1 Tab by mouth daily. Provider, Historical   Lactobacillus acidophilus (PROBIOTIC PO) Take 1 Cap by mouth daily. Provider, Historical   warfarin (COUMADIN) 5 mg tablet Take  by mouth daily. 7.5 mg every day except on Wednesday & Friday take 10 mg 7/16/18   Provider, Historical   ketotifen (ZADITOR) 0.025 % (0.035 %) ophthalmic solution Administer 1 Drop to both eyes two (2) times a day. Provider, Historical   acyclovir (ZOVIRAX) 5 % ointment Apply 1.5 Each to affected area five (5) times daily. As needed    Provider, Historical   magnesium oxide (MAG-OX) 400 mg tablet Take 800 mg by mouth two (2) times a day. Provider, Historical   potassium chloride (K-DUR, KLOR-CON) 20 mEq tablet Take 20 mEq by mouth daily. Provider, Historical   predniSONE (DELTASONE) 5 mg tablet Take 5 mg by mouth daily.     Provider, Historical   ALPRAZolam (XANAX) 0.5 mg tablet Take 0.5 mg by mouth two (2) times daily as needed. Provider, Historical   mycophenolate sodium (MYFORTIC) 360 mg delayed release tablet Take 720 mg by mouth two (2) times a day.     Provider, Historical     Allergies   Allergen Reactions    Atenolol Myalgia    Atorvastatin Myalgia     Other reaction(s): Unknown    Azathioprine Other (comments)     Other reaction(s): Unknown    Iodinated Contrast Media Other (comments)     Had kidney transplant      Ketorolac Tromethamine Other (comments) and Itching       Patient Active Problem List    Diagnosis Date Noted    Other osteoporosis without current pathological fracture 02/05/2020    Candida esophagitis ([facility] [address] 02/05/2020    Long term current use of systemic steroids 10/29/2019    Renal transplant disorder 08/16/2019    Ductal carcinoma in situ (DCIS) of right breast 10/02/2018    Female cystocele 08/15/2017    Chronic anticoagulation 05/02/2017    Immunosuppressed status ([address] 12/19/2016    Atrial fibrillation with RVR ([address] 11/26/2016    Prediabetes 11/25/2016    Coronary artery disease involving native coronary artery of native heart without angina pectoris 11/25/2016    Thoracic spondylosis without myelopathy 05/26/2016    Stented coronary artery 02/24/2015    Crohn's disease of large intestine without complication ([address] 66/18/1731    S/P parathyroidectomy 03/26/2014    Bladder tumor 05/15/2013    Cervical spondylosis without myelopathy 03/12/2013    Dyslipidemia 12/17/2008    Essential hypertension 12/17/2008    Sjogren's syndrome ([address] 12/17/2008    History of kidney transplant 12/17/2008     Past Medical History:   Diagnosis Date    Arthropathy     Atrophic vaginitis     Bladder tumor     Coronary artery disease     Crohn disease ([address]     DJD (degenerative joint disease)     Esophageal reflux     Female cystocele     Fibrocystic breast     Fibromyalgia     GERD (gastroesophageal reflux disease)     History of kidney transplant     IBS (irritable bowel syndrome)     Lower urinary tract symptoms (LUTS)     Nephrogenic adenoma of bladder     Nocturia     Osteopenia     Pelvic organ prolapse quantification stage 1 cystocele     Renal failure     Shingles     Sjogren's syndrome ([facility] [address]     Sleep apnea      Past Surgical History:   Procedure Laterality Date    HX BACK SURGERY  2014    laminectomy T10/11    HX BACK SURGERY  2019    hardware removed    HX BACK SURGERY      laminectomy RIGHT L5/S1    HX BREAST LUMPECTOMY  1998    HX CERVICAL FUSION  2007    HX COLECTOMY  1984    iliocolectomy    HX CORONARY STENT PLACEMENT  ;    HX HEART CATHETERIZATION  2008    HX HEMORRHOIDECTOMY  1993    HX KNEE REPLACEMENT Left 2008    HX KNEE REPLACEMENT  3/2015    left    HX LIPECTOMY      HX MOHS PROCEDURES      HX PARATHYROIDECTOMY      HX RENAL TRANSPLANT      HX TUBAL LIGATION       Family History   Problem Relation Age of Onset    Ovarian Cancer Mother     Diabetes Sister     Hypertension Sister     Diabetes Brother     Hypertension Brother     Hypertension Brother     Diabetes Sister      Social History     Tobacco Use    Smoking status: Former Smoker     Start date:      Last attempt to quit: 2000     Years since quittin.3    Smokeless tobacco: Never Used   Substance Use Topics    Alcohol use: No     Alcohol/week: 0.0 standard drinks       Review of Systems   Constitutional: Negative for chills and fever. HENT: Negative for ear pain and sore throat. Respiratory: Negative for cough and shortness of breath. Cardiovascular: Negative for chest pain and palpitations. Gastrointestinal: Negative for abdominal pain. Genitourinary: Negative for dysuria. Musculoskeletal: Negative for back pain, myalgias and neck pain. Skin: Negative for rash.    Neurological: Positive for tingling (See HPI) and sensory change (See HPI). Negative for speech change, focal weakness and headaches. Endo/Heme/Allergies: Does not bruise/bleed easily. Psychiatric/Behavioral: Negative for depression. The patient is not nervous/anxious (Much improved, off benzodiazepines) and does not have insomnia. Objective:   Vital Signs: (As obtained by patient/caregiver at home)  There were no vitals taken for this visit. Physical Exam  [Not performed -audio only]    Labs / Results:                     MRI C-spine without contrast (10/19/2018):    1. Anterior cervical disc fusion C3-C4 and C6-C7. 2. Multilevel degenerative findings causing various degrees of spinal canal and neuroforaminal narrowing. 3. Loss of cervical lordosis and mild rotoscoliosis. 4. Please see report for multiple additional findings and further details. CLINICAL INDICATION/HISTORY: M54.12: Brachial neuritis    COMPARISON: Radiograph 5/26/2016    TECHNIQUE: Multiplanar multi-sequential imaging of the cervical spine without contrast. T1W, T2W and STIR sequences were obtained in the sagittal plane. T2W and GRE sequences were obtained in the axial plane. FINDINGS:     Postoperative changes: Anterior cervical disc fusion noted at C3-C4 and C6-C7, similar to prior exam. Blooming artifact from the hardware limits evaluation at these levels. Alignment: Loss of cervical lordosis. Trace rotoscoliosis noted. Trace anterolisthesis of C7 on T1. Vertebral body heights: No appreciable change since radiograph. There is multilevel degenerative vertebral body height loss. There is partial osseous fusion noted at the surgical levels and between the surgical levels as before. Marrow signal: Evaluation is limited due to blooming artifact. No gross abnormal marrow signal identified. Cord: No suspect mass. Cerebellar tonsils: No Chiari 1 malformation.     Soft tissues: Normal.    Spinal canal is developmentally narrowed which exacerbates degenerative findings. Correlation of axial and sagittal data through the disc levels:    -C2-3: Broad-based as osteophyte complex and bilateral facet arthropathy, right greater than left. Moderate bilateral neuroforaminal narrowing, left greater than right. Mild spinal canal narrowing. -C3-4: Broad-based as osteophyte complex and right uncovertebral joint hypertrophy. Minimal right and mild left neuroforaminal narrowing. Minimal encroachment on the spinal canal.    -C4-5: No significant disc pathology. No spinal canal or foraminal stenosis. -C5-6: No significant disc pathology. Minimal uncovertebral joint hypertrophy with minimal encroachment on neuroforamen. No spinal canal narrowing. -C6-7: Broad-based disc osteophyte complex and bilateral uncovertebral joint hypertrophy, left greater than right. Cystic lesion is noted within the neuroforamen, likely nerve root sleeve cysts. The one on the right appears mildly more complex. If further evaluation is required repeat exam with contrast can be performed. There is minimal encroachment on the spinal canal and moderate bilateral neuroforaminal narrowing, left greater than right.    -C7-T1: Broad-based bulge/pseudobulge minimal encroachment on spinal canal. No neuroforaminal narrowing. Probable nerve sheath cysts noted bilaterally. IMPRESSION  1. Anterior cervical disc fusion C3-C4 and C6-C7. 2. Multilevel degenerative findings causing various degrees of spinal canal and neuroforaminal narrowing. 3. Loss of cervical lordosis and mild rotoscoliosis. 4. Please see report for multiple additional findings and further details. Review of Records:     Rheumatology (6/12/2020):    Assessment:   1. Osteoporosis: Given significant issues the patient has had with infection, and the relative stability of bone density while being off Prolia over 1 year, will continue to hold Prolia. Check labs.     2. chronic pain: Followed by other physicians    Follow-up recommended in 2 years, at the time of her next bone density           We discussed the expected course, resolution and complications of the diagnosis(es) in detail. Medication risks, benefits, costs, interactions, and alternatives were discussed as indicated. I advised her to contact the office if her condition worsens, changes or fails to improve as anticipated. She expressed understanding with the diagnosis(es) and plan. Remedios Leong is a 77 y.o. female who was evaluated by an audio only encounter for concerns as above. Patient identification was verified prior to start of the visit. A caregiver was present when appropriate. Due to this being a TeleHealth encounter ([facility]), evaluation of the following organ systems was limited: Vitals/Constitutional/EENT/Resp/CV/GI//MS/Neuro/Skin/Heme-Lymph-Imm. Pursuant to the emergency declaration under the [address] [place] [address] waiver authority and the [employer] and [employer] Act, this Virtual Visit was conducted, with patient's (and/or legal guardian's) consent, to reduce the patient's risk of exposure to COVID-19 and provide necessary medical care. Services were provided through a synchronous discussion virtually to substitute for in-person clinic visit. I was in the office. The patient was at home. Geovanna Martinez MD  Internal Medicine, Family Medicine & Sports Medicine    Addendum (6/28/2020):     C-spine XR (6/26/2020):    EXAM: CERVICAL SPINE COMPL W/ FLEX AND EXT    CLINICAL INDICATION/HISTORY: R20.2: Paresthesia of skin  Additional: None    COMPARISON: 10/22/2018    TECHNIQUE: 5 views of the cervical spine, plus flexion and extension in the lateral projection      FINDINGS:    VERTEBRAE AND ALIGNMENT: Straightening of the cervical lordosis. ACDF hardware C3-4. Interbody osseous fusion C4-C6. ACDF hardware C6-7. No subluxation.  No evidence of displaced fracture. Intact hardware. FLEXION/EXTENSION: No evidence of instability. DISC SPACES: Diffuse disc spaces are maintained. PARASPINOUS SOFT TISSUES: Unremarkable. ADDITIONAL: Patent osseous neural foramina. Visualized lung apices are clear. Other Result Information   Interface, [facility] Rad Res - 06/26/2020 12:05 PM EDT  EXAM: CERVICAL SPINE COMPL W/ FLEX AND EXT    CLINICAL INDICATION/HISTORY: R20.2: Paresthesia of skin  Additional: None    COMPARISON: 10/22/2018    TECHNIQUE: 5 views of the cervical spine, plus flexion and extension in the lateral projection      FINDINGS:    VERTEBRAE AND ALIGNMENT: Straightening of the cervical lordosis. ACDF hardware C3-4. Interbody osseous fusion C4-C6. ACDF hardware C6-7. No subluxation. No evidence of displaced fracture. Intact hardware. FLEXION/EXTENSION: No evidence of instability. DISC SPACES: Diffuse disc spaces are maintained. PARASPINOUS SOFT TISSUES: Unremarkable. ADDITIONAL: Patent osseous neural foramina. Visualized lung apices are clear. IMPRESSION    Cervical fusion C3-C7 as discussed without evidence for displaced fracture, subluxation or instability. PLAN:  - will CC Dr. Belen Anne on these notes and encourage Shauna Rojas to give him a call for follow-up  - may need to try to increase pravachol frequency (doing every other day 2/2 myalgia)  - ongoing 'prediabetes'    CC:  Celestia Frankel, MD (Cardiology)  Maria L Morgan MD (Neurosurgery)  Keyona Bear MD (Hematology and Oncology)  Jon Turner MD (Nephrology)  Holley Ramirez MD (Infectious Diseases)    Bryant Mcfarland MD  Internal Medicine, Family Medicine & Sports Medicine  6/28/2020 2:13 PM     Addendum (6/30/2020):     Received msg from patient stating that her dentist, Dr. Lisa Holm, has examined her mass in her mouth and believes it is a possible oral papilloma, and recommends referral to oral surgeon for excision.     Orders Placed This Encounter    REFERRAL TO ORAL MAXILLOFACIAL SURGERY     Referral Priority:   Routine     Referral Type:   Consultation     Referral Reason:   Specialty Services Required     Referred to Provider:   Vijaya Doan DDS     Number of Visits Requested:   Ap Perkins MD  Internal Medicine, Family Medicine & Sports Medicine  6/30/2020 2:20 PM     Addendum (7/13/2020):     Patient spoke with neurosurgeon's office who recommends referral to neurology first.    Orders Placed This Encounter    REFERRAL TO NEUROLOGY     Referral Priority:   Routine     Referral Type:   Consultation     Referral Reason:   Specialty Services Required     Referred to Provider:   Alex Gutierrez MD     Number of Visits Requested:   1         aPpa Dawson MD  Internal Medicine, Family Medicine & Sports Medicine  7/13/2020 4:01 PM

## 2020-06-27 LAB
AVG GLU, 10930: 122 MG/DL (ref 91–123)
CHOLEST SERPL-MCNC: 277 MG/DL (ref 110–200)
FE % SATURATION,PSAT: 12 % (ref 20–50)
FERRITIN SERPL-MCNC: 62 NG/ML (ref 10–291)
FOLATE,FOL: 17.7 NG/ML
HBA1C MFR BLD HPLC: 5.9 % (ref 4.8–5.6)
HDLC SERPL-MCNC: 3.3 MG/DL (ref 0–5)
HDLC SERPL-MCNC: 84 MG/DL
IRON,IRN: 45 MCG/DL (ref 30–160)
LDL/HDL RATIO,LDHD: 2.1
LDLC SERPL CALC-MCNC: 180 MG/DL (ref 50–99)
NON-HDL CHOLESTEROL, 011976: 193 MG/DL
T4 FREE SERPL-MCNC: 0.9 NG/DL (ref 0.9–1.8)
TIBC,TIBC: 377 MCG/DL (ref 228–428)
TRIGL SERPL-MCNC: 63 MG/DL (ref 40–149)
TSH SERPL DL<=0.005 MIU/L-ACNC: 1.58 MCU/ML (ref 0.27–4.2)
UIBC SERPL-MCNC: 332 MCG/DL (ref 110–370)
VIT B12 SERPL-MCNC: 814 PG/ML (ref 211–911)
VLDLC SERPL CALC-MCNC: 13 MG/DL (ref 8–30)

## 2020-06-28 ENCOUNTER — TELEPHONE (OUTPATIENT)
Dept: FAMILY MEDICINE CLINIC | Age: 75
End: 2020-06-28

## 2020-06-28 DIAGNOSIS — R73.03 PREDIABETES: ICD-10-CM

## 2020-06-28 DIAGNOSIS — R20.2 NUMBNESS AND TINGLING IN BOTH HANDS: Primary | ICD-10-CM

## 2020-06-28 DIAGNOSIS — D84.9 IMMUNOSUPPRESSED STATUS (HCC): ICD-10-CM

## 2020-06-28 DIAGNOSIS — E78.5 DYSLIPIDEMIA: ICD-10-CM

## 2020-06-28 DIAGNOSIS — R20.0 NUMBNESS AND TINGLING IN BOTH HANDS: Primary | ICD-10-CM

## 2020-06-28 RX ORDER — METOPROLOL TARTRATE 50 MG/1
50 TABLET ORAL 2 TIMES DAILY
Qty: 180 TAB | Refills: 1 | Status: SHIPPED | OUTPATIENT
Start: 2020-06-28

## 2020-06-28 NOTE — TELEPHONE ENCOUNTER
Please call Haroon Rodrigez. \"Neck x-ray looked relatively good, without any concern for displaced or broken hardware. Already faxed over a copy of the results and last note to Dr. Andersen Began. Please call Dr. [facility] office to schedule follow-up regarding your bilateral hand numbness and tingling. Cholesterol was \"okay\". If you could tolerate taking your Pravachol a bit more frequently, likely would be beneficial.    A1c is 5.9. Much improved from the A1c of 6.4 in January! Keep up the good work\"    Thanks.

## 2020-06-29 NOTE — TELEPHONE ENCOUNTER
[facility] Please inform patient.

## 2020-06-29 NOTE — TELEPHONE ENCOUNTER
Spoke with patient and gave message.  Patient is requesting a xray of RT shoulder  I asked pain scale and she stated 2/10

## 2020-06-30 ENCOUNTER — TELEPHONE (OUTPATIENT)
Dept: FAMILY MEDICINE CLINIC | Age: 75
End: 2020-06-30

## 2020-06-30 DIAGNOSIS — R20.2 NUMBNESS AND TINGLING IN BOTH HANDS: ICD-10-CM

## 2020-06-30 DIAGNOSIS — R20.0 NUMBNESS AND TINGLING IN BOTH HANDS: ICD-10-CM

## 2020-06-30 NOTE — TELEPHONE ENCOUNTER
Referral ordered for Katharina Kent DDS from last OV and last OV note was addended with appropriate info.

## 2020-06-30 NOTE — TELEPHONE ENCOUNTER
Patient called stating she seen the Dentist Dr. Sabi Leon in regards to the growth in her mouth and he states it might be an oral papilloma on soft palate and is recommending patient be sent to a oral surgeon for removal and biopsy and stated PCP would need to do this. Please advise.

## 2020-07-07 DIAGNOSIS — R20.2 NUMBNESS AND TINGLING IN BOTH HANDS: ICD-10-CM

## 2020-07-07 DIAGNOSIS — R20.0 NUMBNESS AND TINGLING IN BOTH HANDS: ICD-10-CM

## 2020-07-07 DIAGNOSIS — M25.511 RIGHT SHOULDER PAIN, UNSPECIFIED CHRONICITY: ICD-10-CM

## 2020-07-13 ENCOUNTER — TELEPHONE (OUTPATIENT)
Dept: FAMILY MEDICINE CLINIC | Age: 75
End: 2020-07-13

## 2020-07-13 NOTE — TELEPHONE ENCOUNTER
Patient called requesting a referral to see a neurologist about the tingling and numbness in her hands.      Please advise

## 2020-07-13 NOTE — TELEPHONE ENCOUNTER
Pt is calling to get xray result she stated that she had called last week but hasn't heard anything yet

## 2020-07-13 NOTE — TELEPHONE ENCOUNTER
Patient informed she states she called them and they suggest she see neurologist instead. Please advise.

## 2020-07-13 NOTE — TELEPHONE ENCOUNTER
Called patient and left message for her to return call to inform her that per Dr. Jailene Manley she needs to contact her neurosurgereon Dr. Ayla Eastman and schedule appointment w/ him.

## 2020-09-21 ENCOUNTER — HOSPITAL ENCOUNTER (OUTPATIENT)
Dept: LAB | Age: 75
Discharge: HOME | End: 2020-09-21
Payer: MEDICARE

## 2020-09-21 ENCOUNTER — OFFICE VISIT (OUTPATIENT)
Dept: FAMILY MEDICINE CLINIC | Age: 75
End: 2020-09-21

## 2020-09-21 VITALS
SYSTOLIC BLOOD PRESSURE: 146 MMHG | HEART RATE: 53 BPM | TEMPERATURE: 97.3 F | HEIGHT: 64 IN | BODY MASS INDEX: 30.73 KG/M2 | RESPIRATION RATE: 16 BRPM | OXYGEN SATURATION: 100 % | WEIGHT: 180 LBS | DIASTOLIC BLOOD PRESSURE: 71 MMHG

## 2020-09-21 DIAGNOSIS — D64.9 ANEMIA, UNSPECIFIED TYPE: ICD-10-CM

## 2020-09-21 DIAGNOSIS — R53.83 FATIGUE, UNSPECIFIED TYPE: ICD-10-CM

## 2020-09-21 DIAGNOSIS — R79.9 ABNORMAL FINDING OF BLOOD CHEMISTRY, UNSPECIFIED: ICD-10-CM

## 2020-09-21 DIAGNOSIS — I10 ESSENTIAL HYPERTENSION: ICD-10-CM

## 2020-09-21 DIAGNOSIS — C50.911 MALIGNANT NEOPLASM OF RIGHT FEMALE BREAST, UNSPECIFIED ESTROGEN RECEPTOR STATUS, UNSPECIFIED SITE OF BREAST (HCC): ICD-10-CM

## 2020-09-21 DIAGNOSIS — I48.91 ATRIAL FIBRILLATION WITH RVR (HCC): ICD-10-CM

## 2020-09-21 DIAGNOSIS — R68.89 OTHER GENERAL SYMPTOMS AND SIGNS: ICD-10-CM

## 2020-09-21 DIAGNOSIS — R59.0 LYMPHADENOPATHY, ANTERIOR CERVICAL: ICD-10-CM

## 2020-09-21 DIAGNOSIS — Z94.0 HISTORY OF KIDNEY TRANSPLANT: ICD-10-CM

## 2020-09-21 DIAGNOSIS — D84.9 IMMUNOSUPPRESSED STATUS (HCC): Primary | ICD-10-CM

## 2020-09-21 DIAGNOSIS — R61 EXCESSIVE SWEATING: ICD-10-CM

## 2020-09-21 DIAGNOSIS — Z23 NEEDS FLU SHOT: ICD-10-CM

## 2020-09-21 LAB
ALBUMIN SERPL-MCNC: 4 G/DL (ref 3.4–5)
ALBUMIN/GLOB SERPL: 1.3 {RATIO} (ref 0.8–1.7)
ALP SERPL-CCNC: 96 U/L (ref 45–117)
ALT SERPL-CCNC: 25 U/L (ref 13–56)
ANION GAP SERPL CALC-SCNC: 9 MMOL/L (ref 3–18)
AST SERPL-CCNC: 21 U/L (ref 10–38)
BASOPHILS # BLD: 0 K/UL (ref 0–0.1)
BASOPHILS NFR BLD: 0 % (ref 0–2)
BILIRUB SERPL-MCNC: 0.2 MG/DL (ref 0.2–1)
BUN SERPL-MCNC: 18 MG/DL (ref 7–18)
BUN/CREAT SERPL: 21 (ref 12–20)
CALCIUM SERPL-MCNC: 9.5 MG/DL (ref 8.5–10.1)
CHLORIDE SERPL-SCNC: 111 MMOL/L (ref 100–111)
CO2 SERPL-SCNC: 24 MMOL/L (ref 21–32)
CREAT SERPL-MCNC: 0.86 MG/DL (ref 0.6–1.3)
DIFFERENTIAL METHOD BLD: ABNORMAL
EOSINOPHIL # BLD: 0.1 K/UL (ref 0–0.4)
EOSINOPHIL NFR BLD: 1 % (ref 0–5)
ERYTHROCYTE [DISTWIDTH] IN BLOOD BY AUTOMATED COUNT: 17.8 % (ref 11.6–14.5)
EST. AVERAGE GLUCOSE BLD GHB EST-MCNC: 120 MG/DL
FERRITIN SERPL-MCNC: 42 NG/ML (ref 8–388)
FOLATE SERPL-MCNC: >20 NG/ML (ref 3.1–17.5)
GLOBULIN SER CALC-MCNC: 3.2 G/DL (ref 2–4)
GLUCOSE SERPL-MCNC: 90 MG/DL (ref 74–99)
HBA1C MFR BLD: 5.8 % (ref 4.2–5.6)
HCT VFR BLD AUTO: 39.4 % (ref 35–45)
HGB BLD-MCNC: 12.5 G/DL (ref 12–16)
IRON SATN MFR SERPL: 13 % (ref 20–50)
IRON SERPL-MCNC: 48 UG/DL (ref 50–175)
LYMPHOCYTES # BLD: 1.1 K/UL (ref 0.9–3.6)
LYMPHOCYTES NFR BLD: 14 % (ref 21–52)
MAGNESIUM SERPL-MCNC: 2.2 MG/DL (ref 1.6–2.6)
MCH RBC QN AUTO: 23.2 PG (ref 24–34)
MCHC RBC AUTO-ENTMCNC: 31.7 G/DL (ref 31–37)
MCV RBC AUTO: 73.1 FL (ref 74–97)
MONOCYTES # BLD: 0.4 K/UL (ref 0.05–1.2)
MONOCYTES NFR BLD: 6 % (ref 3–10)
NEUTS SEG # BLD: 5.9 K/UL (ref 1.8–8)
NEUTS SEG NFR BLD: 79 % (ref 40–73)
PHOSPHATE SERPL-MCNC: 3.7 MG/DL (ref 2.5–4.9)
PLATELET # BLD AUTO: 212 K/UL (ref 135–420)
PMV BLD AUTO: 10.7 FL (ref 9.2–11.8)
POTASSIUM SERPL-SCNC: 4.2 MMOL/L (ref 3.5–5.5)
PROT SERPL-MCNC: 7.2 G/DL (ref 6.4–8.2)
RBC # BLD AUTO: 5.39 M/UL (ref 4.2–5.3)
SODIUM SERPL-SCNC: 144 MMOL/L (ref 136–145)
T4 FREE SERPL-MCNC: 0.9 NG/DL (ref 0.7–1.5)
TIBC SERPL-MCNC: 383 UG/DL (ref 250–450)
TSH SERPL DL<=0.05 MIU/L-ACNC: 0.97 UIU/ML (ref 0.36–3.74)
VIT B12 SERPL-MCNC: 786 PG/ML (ref 211–911)
WBC # BLD AUTO: 7.5 K/UL (ref 4.6–13.2)

## 2020-09-21 PROCEDURE — 84439 ASSAY OF FREE THYROXINE: CPT

## 2020-09-21 PROCEDURE — 36415 COLL VENOUS BLD VENIPUNCTURE: CPT

## 2020-09-21 PROCEDURE — 85025 COMPLETE CBC W/AUTO DIFF WBC: CPT

## 2020-09-21 PROCEDURE — 84443 ASSAY THYROID STIM HORMONE: CPT

## 2020-09-21 PROCEDURE — 84100 ASSAY OF PHOSPHORUS: CPT

## 2020-09-21 PROCEDURE — 82728 ASSAY OF FERRITIN: CPT

## 2020-09-21 PROCEDURE — 80053 COMPREHEN METABOLIC PANEL: CPT

## 2020-09-21 PROCEDURE — 83735 ASSAY OF MAGNESIUM: CPT

## 2020-09-21 PROCEDURE — 83036 HEMOGLOBIN GLYCOSYLATED A1C: CPT

## 2020-09-21 PROCEDURE — 82607 VITAMIN B-12: CPT

## 2020-09-21 PROCEDURE — 83540 ASSAY OF IRON: CPT

## 2020-09-21 RX ORDER — HYDROGEN PEROXIDE 3 %
20 SOLUTION, NON-ORAL MISCELLANEOUS DAILY
COMMUNITY
End: 2020-10-13

## 2020-09-21 RX ORDER — DESONIDE 0.5 MG/G
CREAM TOPICAL
COMMUNITY
Start: 2020-06-29

## 2020-09-21 RX ORDER — DOXYCYCLINE 100 MG/1
CAPSULE ORAL
COMMUNITY
Start: 2020-09-09

## 2020-09-21 RX ORDER — KETOCONAZOLE 20 MG/G
CREAM TOPICAL
COMMUNITY
Start: 2020-09-17

## 2020-09-21 RX ORDER — HYDROCORTISONE ACETATE 25 MG/1
SUPPOSITORY RECTAL
COMMUNITY
Start: 2020-09-18 | End: 2020-09-21

## 2020-09-21 NOTE — PROGRESS NOTES
Ange Bearden is a 77 y.o. female (: 1945) presenting to address:    Chief Complaint   Patient presents with    Excessive Sweating     since March    Fatigue    Immunization/Injection     Flu AD     Patient DECLINED Pneumo 23 vaccine. Flu AD Immunization/s administered 2020 by Alcira Mckenzie LPN with guardian's consent. Patient tolerated procedure well. No reactions noted. Vitals:    20 0905 20 0922   BP: (!) 149/74 (!) 146/71   Pulse: (!) 53    Resp: 16    Temp: 97.3 °F (36.3 °C)    TempSrc: Temporal    SpO2: 100%    Weight: 180 lb (81.6 kg)    Height: 5' 4\" (1.626 m)    PainSc:   3    PainLoc: Hip        Hearing/Vision:   No exam data present    Learning Assessment:     Learning Assessment 10/23/2019   PRIMARY LEARNER Patient   HIGHEST LEVEL OF EDUCATION - PRIMARY LEARNER  2 YEARS OF COLLEGE   BARRIERS PRIMARY LEARNER NONE   CO-LEARNER CAREGIVER No   PRIMARY LANGUAGE ENGLISH   LEARNER PREFERENCE PRIMARY LISTENING   ANSWERED BY stephenie   RELATIONSHIP SELF     Depression Screening:     3 most recent PHQ Screens 2020   Little interest or pleasure in doing things Not at all   Feeling down, depressed, irritable, or hopeless Not at all   Total Score PHQ 2 0     Fall Risk Assessment:     Fall Risk Assessment, last 12 mths 2020   Able to walk? Yes   Fall in past 12 months? No     Abuse Screening:     Abuse Screening Questionnaire 2020   Do you ever feel afraid of your partner? N   Are you in a relationship with someone who physically or mentally threatens you? N   Is it safe for you to go home? Y     Coordination of Care Questionaire:   1. Have you been to the ER, urgent care clinic since your last visit? Hospitalized since your last visit? YES [address]    2.  Have you seen or consulted any other health care providers outside of the [address] [place] [address] since your Dr. Charmaine Rosas, 9/15/20 Dr. Debra Heredia, 20 Dr. Durell Gaucher; 20 [facility]. Advanced Directive:   1. Do you have an Advanced Directive? NO    2. Would you like information on Advanced Directives?  NO

## 2020-09-21 NOTE — PROGRESS NOTES
[address] [facility]  Primary Care Office Visit - Problem-Oriented    : 1945   Shruti Glover is a 77 y.o. female presenting for  Chief Complaint   Patient presents with    Excessive Sweating     since March    Fatigue    Immunization/Injection     Flu AD            Assessment/Plan:           ICD-10-CM ICD-9-CM   1. Immunosuppressed status ([facility]  D89.9 279.9   2. Excessive sweating  R61 780.8   3. History of kidney transplant  Z94.0 V42.0   4. Needs flu shot  Z23 V04.81   5. Essential hypertension  I10 401.9   6. Fatigue, unspecified type  R53.83 780.79   7. Abnormal finding of blood chemistry, unspecified   R79.9 790.6   8. Other general symptoms and signs   R68.89 780.99   9. Anemia, unspecified type   D64.9 285.9   10. Atrial fibrillation with RVR (HCC)  I48.91 427.31   11. Malignant neoplasm of right female breast, unspecified estrogen receptor status, unspecified site of breast ([facility]  C50.911 174.9   12. Lymphadenopathy, anterior cervical  R59.0 785. 6         Pleasant 74yo medically complex AAF with PMHx of kidney transplant on immunosuppression, AFib on coumadin (waiting on switching to Eliquis) presents with ongoing non-specific complaints of fatigue and excessive sweating, as well as ongoing struggles cutaneous fungal infections per patient report. Mildly hypertensive today, regular rate on ausculation. Ongoing concerns re: numbness, pending neurology evaluation.   Some slightly tender diffuse anterior cervical lymphadenopathy on exam.    > will check labs, however will have low threshold for referral to endocrinology for possible adrenal insufficiency workup, in light of her non-specific complaints and medical complexity (as well as limitations of my lab re: ACTH stim test)    > will request last notes from nephrology    > ask Shruti Glover to ask neurology to forward copies of notes as well    > also likely will ask ID to consider closer follow-up with patient, for the ongoing cutaneous & oral fungal concerns as well finding of slightly painful cervical lymphadenopathy on exam        Orders Placed This Encounter    Influenza Vaccine, QUAD, 65 Yrs +  IM  (Fluad 96531 )    T4, FREE     Standing Status:   Future     Number of Occurrences:   1     Standing Expiration Date:   9/21/2021    TSH 3RD GENERATION     Standing Status:   Future     Number of Occurrences:   1     Standing Expiration Date:   9/21/2021    CBC WITH AUTOMATED DIFF     Standing Status:   Future     Number of Occurrences:   1     Standing Expiration Date:   2/21/4402    METABOLIC PANEL, COMPREHENSIVE     Standing Status:   Future     Number of Occurrences:   1     Standing Expiration Date:   9/21/2021    HEMOGLOBIN A1C WITH EAG     Standing Status:   Future     Number of Occurrences:   1     Standing Expiration Date:   9/21/2021    MAGNESIUM     Standing Status:   Future     Number of Occurrences:   1     Standing Expiration Date:   9/21/2021    PHOSPHORUS     Standing Status:   Future     Number of Occurrences:   1     Standing Expiration Date:   9/21/2021    FERRITIN     Standing Status:   Future     Number of Occurrences:   1     Standing Expiration Date:   9/21/2021       Spent 40min face-to-face, >50% spent on counseling, patient education, chart review & coordination of care. This document may have been created with the aid of dictation software. Text may contain errors, particularly phonetic errors. Reviewed management plan & instructions with patient, who voiced understanding.          Sp Story MD  Internal Medicine, Family Medicine & Sports Medicine  9/21/2020    Subjective   History:   Vimal Hunt is a 77 y.o. female presenting to address:  Chief Complaint   Patient presents with    Excessive Sweating     since March    Fatigue    Immunization/Injection     Flu AD       Last VV: 6/23/2020  Last OV: 2/24/2020      # numbness  Has upcoming appt with Dr. Geremias Phelps in Oct 2020  Was seen by neurosurgery, who recommends neuro eval as well, ? Neuropathy      # renal  There was a question of a cyst on the transplanted kidney on lumbar MRI from earlier this month, however Itz Torres notes that Dr. Dineen Claude checked an ultrasound and no cysts were seen      # A-Fib  Currently on coumadin. Is supposed to switch to Eliquis in Oct.  Had to apply for some financial assistance for that, which lead to the delay. # fungus / immunocompromised / transplant status  Reports last appt with Dr. Niki Howell was in June. Was given clotrimazole janell for lesion on tongue, but \"still there\"  Also has some 'fungus rashes'  \"I think they wanted to give me a fungus medication by mouth, but there are some interactions with the coumadin, so they can't right now.  Maybe they will be able to once I get on the Eliquis\"      # excessive sweating / fatigue  Most bothered by \"lots of sweating and feeling tired all the time\"        Past Medical History:   Diagnosis Date    Arthropathy     Atrophic vaginitis     Bladder tumor     Coronary artery disease     Crohn disease ([address]     DJD (degenerative joint disease)     Esophageal reflux     Female cystocele     Fibrocystic breast     Fibromyalgia     GERD (gastroesophageal reflux disease)     History of kidney transplant     IBS (irritable bowel syndrome)     Lower urinary tract symptoms (LUTS)     Nephrogenic adenoma of bladder     Nocturia     Osteopenia     Pelvic organ prolapse quantification stage 1 cystocele     Renal failure     Shingles     Sjogren's syndrome ([address]     Sleep apnea      Past Surgical History:   Procedure Laterality Date    HX BACK SURGERY  04/2014    laminectomy T10/11    HX BACK SURGERY  09/2019    hardware removed    HX BACK SURGERY  2014    laminectomy RIGHT L5/S1    HX BREAST LUMPECTOMY  1998    HX CERVICAL FUSION  2007    HX COLECTOMY  1984    iliocolectomy    HX CORONARY STENT PLACEMENT  2005;2008    HX HEART CATHETERIZATION  2008    HX HEMORRHOIDECTOMY  1993    HX HIP REPLACEMENT Right 2020    HX KNEE REPLACEMENT Left     HX KNEE REPLACEMENT  3/2015    left    HX LIPECTOMY      HX MOHS PROCEDURES      HX PARATHYROIDECTOMY      HX RENAL TRANSPLANT      HX TUBAL LIGATION        reports that she quit smoking about 20 years ago. She started smoking about 46 years ago. She has never used smokeless tobacco. She reports that she does not drink alcohol or use drugs. Social History     Social History Narrative    Oct 2019:  x 38 years. Used to work for [employer]. Received kidney transplant in .      Social History     Tobacco Use   Smoking Status Former Smoker    Start date:     Last attempt to quit: 2000    Years since quittin.5   Smokeless Tobacco Never Used     Family History   Problem Relation Age of Onset    Ovarian Cancer Mother     Diabetes Sister     Hypertension Sister     Diabetes Brother     Hypertension Brother     Hypertension Brother     Diabetes Sister      Allergies   Allergen Reactions    Atenolol Myalgia    Atorvastatin Myalgia     Other reaction(s): Unknown    Azathioprine Other (comments)     Other reaction(s): Unknown    Iodinated Contrast Media Other (comments)     Had kidney transplant      Ketorolac Tromethamine Other (comments) and Itching     Other reaction(s): Unknown       Problem List:      Patient Active Problem List    Diagnosis    Other osteoporosis without current pathological fracture     S/p Prolia: 2019, 10/24/2018, 3/26/2018, 2017, 2017, 2016, 2016, 2015      Candida esophagitis ([facility]     EGD 2019 (Dr. Robison Mei)     [address] [place] [address] Long term current use of systemic steroids    Renal transplant disorder    Ductal carcinoma in situ (DCIS) of right breast     -2020 (heme-onc): Right breast cancer ER positive, MT positive, HER-2 positive (3+) metastatic to lymph nodes (which were triple negative, only seen on IHC, and is technically treated as node-negative) and DCIS status post bilateral mastectomies. Invasive component was hormone receptor negative, therefore not on hormonal therapy      Female cystocele    Chronic anticoagulation    Immunosuppressed status (HCC)    Atrial fibrillation with RVR (HCC)    Prediabetes    Coronary artery disease involving native coronary artery of native heart without angina pectoris    Thoracic spondylosis without myelopathy    Stented coronary artery    Crohn's disease of large intestine without complication ([facility]    S/P parathyroidectomy    Bladder tumor    Cervical spondylosis without myelopathy    Dyslipidemia    Essential hypertension    Sjogren's syndrome ([facility]    History of kidney transplant       Medications:     Current Outpatient Medications   Medication Sig    esomeprazole (NexIUM) 20 mg capsule Take 20 mg by mouth daily.  doxycycline (VIBRAMYCIN) 100 mg capsule take 1 capsule by mouth twice a day    desonide (TRIDESILON) 0.05 % cream APPLY TO AFFECTED AREA OF FACE TWICE A DAY AS NEEDED    ketoconazole (NIZORAL) 2 % topical cream apply to affected area ON THE FACE twice a day    pravastatin (PRAVACHOL) 20 mg tablet take 1 tablet by mouth once daily at bedtime (Patient taking differently: Take 20 mg by mouth every Monday, Wednesday, Friday.)    metoprolol tartrate (LOPRESSOR) 50 mg tablet Take 1 Tab by mouth two (2) times a day.  acetaminophen/diphenhydramine (TYLENOL PM EXTRA STRENGTH PO) Take 1 Tab by mouth nightly as needed.  carboxymethylcellulose sodium (REFRESH OP) Apply  to eye.  cholecalciferol (VITAMIN D3) (1000 Units /25 mcg) tablet Take 7,000 Units by mouth every three (3) days.  dilTIAZem SR (CARDIZEM SR) 60 mg SR capsule Take 1 Cap by mouth two (2) times a day.  krill-om-3-dha-epa-phospho-ast (KRILL OIL) 1,451-084-28-80 mg cap Take 1,000 mg by mouth daily.     methylcellulose (CITRUCEL) 500 mg tablet Take 500 mg by mouth daily.  furosemide (LASIX) 40 mg tablet Take 40 mg by mouth daily as needed.  losartan (COZAAR) 100 mg tablet Take 100 mg by mouth nightly.  multivit-min/FA/lutein/zeaxant (MACULAR VITAMIN PO) Take 1 Tab by mouth daily.  Lactobacillus acidophilus (PROBIOTIC PO) Take 1 Cap by mouth daily.  warfarin (COUMADIN) 5 mg tablet Take  by mouth daily. 7.5 mg every day except on Wednesday & Friday take 10 mg    ketotifen (ZADITOR) 0.025 % (0.035 %) ophthalmic solution Administer 1 Drop to both eyes two (2) times daily as needed.  acyclovir (ZOVIRAX) 5 % ointment Apply 1.5 Each to affected area five (5) times daily. As needed    magnesium oxide (MAG-OX) 400 mg tablet Take 800 mg by mouth two (2) times a day.  potassium chloride (K-DUR, KLOR-CON) 20 mEq tablet Take 20 mEq by mouth daily.  predniSONE (DELTASONE) 5 mg tablet Take 5 mg by mouth daily.  mycophenolate sodium (MYFORTIC) 360 mg delayed release tablet Take 720 mg by mouth two (2) times a day.  clotrimazole (MYCELEX) 10 mg janell clotrimazole 10 mg janell   dissolve 1 janell by mouth five times a day for 14 days     No current facility-administered medications for this visit. Review of Systems:     Review of Systems   Constitutional: Positive for malaise/fatigue. Skin: Positive for itching and rash. Endo/Heme/Allergies:        + sweating              Objective   Physical Assessment:   VS:    Vitals:    09/21/20 0905 09/21/20 0922   BP: (!) 149/74 (!) 146/71   Pulse: (!) 53    Resp: 16    Temp: 97.3 °F (36.3 °C)    TempSrc: Temporal    SpO2: 100%    Weight: 180 lb (81.6 kg)    Height: 5' 4\" (1.626 m)    PainSc:   3    PainLoc: Hip        Physical Exam  Nursing note reviewed. Constitutional:       Appearance: She is well-developed. She is not diaphoretic. HENT:      Head: Normocephalic and atraumatic.       Right Ear: Tympanic membrane, ear canal and external ear normal.      Left Ear: Tympanic membrane, ear canal and external ear normal.      Ears:      Comments: Mask in place  Neck:      Musculoskeletal: Neck supple. Thyroid: No thyromegaly. Cardiovascular:      Rate and Rhythm: Normal rate and regular rhythm. Heart sounds: No murmur. Pulmonary:      Effort: Pulmonary effort is normal.      Breath sounds: Normal breath sounds. No wheezing or rales. Musculoskeletal: Normal range of motion. Lymphadenopathy:      Cervical: Cervical adenopathy (general LAD, mildly tender bilaterally) present. Skin:     General: Skin is warm and dry. Neurological:      Mental Status: She is alert and oriented to person, place, and time. Cranial Nerves: No cranial nerve deficit. Coordination: Coordination normal.   Psychiatric:         Behavior: Behavior normal.         Thought Content: Thought content normal.         Judgment: Judgment normal.         Recent Labs & Imaging:           Retroperitoneal US (9/12/2020):  1. Tiny echogenic native kidneys without hydronephrosis or shadowing stones. No renal cysts are seen. 2. Normal-appearing right lower quadrant transplant kidney. MRI L-spine (9/1/2020):  1. Similar-appearing postsurgical changes of laminectomy and fusion L3-S1.  -Patent central canal throughout fusion levels. -Multilevel foraminal stenosis at fusion levels most advanced moderate to severe stenosis left L5-S1.  2. No significant interval change in multilevel degenerative changes with multifactorial moderate central canal and foraminal stenosis at L2-3.  3. Right pelvis the renal transplant with subcentimeter T2 hyperintense lesions, most likely cysts and can be evaluated with ultrasound. MRI C-spine (9/1/2020):  1. No significant interval change of anterior cervical discectomy and fusion extends from C3 through C7 with osseous fusion and patent central canal and foramina throughout fusion levels.   2. Degenerative changes above and below fusion levels with mild central canal stenosis C2-3 and C7-T1, mild and moderate foraminal stenosis at these levels and grade 1 anterolisthesis C7-T1 as discussed. flex sig (8/18/2020):   hemmorhoids                      Review Records:         Cardiology (8/18/2020):    (I48.0) PAF (paroxysmal atrial fibrillation) - Plan: EKG (58375)    (Z79.01) Chronic anticoagulation    (I25.10) Coronary artery disease involving native coronary artery of native heart without angina pectoris    (Z95.5) Stented coronary artery    (E78.5) Dyslipidemia    (I10) Essential hypertension    Recommendations: CAD/PAF/chronic anticaog/SOB/htn/dyslipidemia: The exact cause of her symptoms is unclear. As noted her EKG is reassuring. Her physical exam demonstrates clear lungs without any wheezes or rales. She is going to follow-up with ID to obtain screening blood work. If her symptoms persist, she may benefit from going to the ED for for a more detailed evaluation to include a chest x-ray and additional blood work. No change in medications for now. Return in about 3 months (around 11/18/2020). Instructed to call the office prior to next visit if needed. SUBJECTIVE/INTERVAL EVENTS: CAD/PAF/chronic anticaog/SOB/htn/dyslipidemia: 5 nights ago she had some palpitations that she thought may be an episode of A. fib. They resolve spontaneously and have not recurred. However, since that time she has had a number of symptoms to include shortness of breath, left-sided/left lateral chest discomfort, a general sense of not feeling well, a lot of anxiety, and some mild edema. She denies any fever or chills. No cough. No sick exposures. EKG today in the office is reassuring. Sinus rhythm minimal T wave changes in the lateral leads. No ST segment changes. Overall it appears a benign EKG. No orthopnea, or PND. No dizziness, lightheadedness, or syncope. No edema. Review of systems: No fever, chills, nausea, vomiting. No bleeding. GI (7/31/2020):      Increase Citrucel, titrate down PPI (Nexium 40 daily x2 weeks, then Nexium OTC 20 mg daily x2 weeks then Nexium over-the-counter 20 mg daily every other day for 2 weeks, then stop), follow-up appointment in office for rectal exam and abdominal exam    Heme/onc (6/29/2020):    ONCOLOGY HISTORY:  I. pTXpN0(i+). Right DCIS is ER positive, NV negative, her 2 Karen positive (3+). [place] nodes: ER negative, NV negative, her 2-. A. Bilateral mammogram, 08/06/2018. 3 mm grouping of amorphous microcalcifications at 9 o'clock in the middle 1/3 of the right breast, suspicious. B. Stereotactic core biopsies of the right breast, 08/16/2018. DCIS, nuclear grade 2. Cribriform type with central necrosis and calcifications. ER positive, NV positive. C. Excisional biopsy of the right breast, 09/27/2018, by Dr. Jason Jacoem. DCIS, grade 2. Approximately 3.3 cm. DCIS extends to medial, inferior, and deep margins. 0/1 nodes. ER positive, NV positive. D. Bilateral mastectomy and sentinel node biopsy, 11/06/2018 by Dr. Lakshmi Kahn. Left breast: Simple fibrocystic disease of the breast. Sclerosing adenosis. No carcinoma seen. Right breast: Residual ductal carcinoma in situ in the lateral inferior quadrant measuring at least 4.5 x 4.2 x 3.5 cm in greatest dimension. Grade 2. No invasive carcinoma seen. ER positive, NV negative, her 2 Karen positive (3+). Right axillary sentinel nodes: 2/2 with metastatic ductal carcinoma (less than 200 cells). [place] lymph nodes: ER negative, NV negative, her 2 Karen negative. PTXpN0(i+)  BELIA Salcido on 11/20/18: Negative for clinically significant mutations. F. Adjuvant Radiation under the direction of Dr. Wilma Mckeon from 12/13/18 through 1/18/19. G. Biopsy of L5-S1 on April 23, 2019 was negative for malignancy. CURRENT TREATMENT:   Observation. ASSESSMENT AND PLAN:    1. Right pTXpN0(i+). DCIS is ER positive, NV negative, her 2 Karen positive (3+). [place] nodes: ER negative, NV negative, her 2-.  S/p bilateral mastectomies for the DCIS component; therefore, per NCCN guidelines, is not a candidate for adjuvant hormonal therapy. The triple negative invasive carcinoma to her lymph nodes was only seen on IHC and is technically treated as node negative. This invasive component was hormone receptor negative therefore she is not on hormonal therapy. Ms. Rita Cruz continues to show no evidence of breast cancer recurrence of exam or by history today. We discussed an u/s of the L axilla as she is concerned about an area which feels like post-operative changes. She declines this and I reassured her that it does not feel worrisome. She was encouraged to call if it changes on self-exam. She remains in observation at this time and we will continue follow ups per NCCN guidelines. We discussed NCCN guidelines for follow-up of breast cancer. She will have a history and physical every 3 months for 2 years then every 6 months for at least 3 years, and then annually or sooner if needed. Mammography every 12 months although routine imaging of a reconstructed breast is not indicated. Unless there are clinical signs or symptoms which are worrisome for recurrent disease, there is no indication for routing imagine studies for metastases screening. I have also encouraged an active lifestyle with at least 150 minutes of moderate exercise weekly, healthy diet, limited alcohol intake and trying to maintain an ideal body weight. These may lead to improved breast cancer outcomes. The patient will have standard laboratory testing and treatments per NCCN guidelines or specific chemotherapy order regimens in [facility] templates. Medications and additional testing will be adjusted as needed. A CMP is pending today. 2. [facility]. This will not alter our management. 3. Hip pain. S/p hip replacement with Dr. Danie Hammans on 3/10/20.     4. She had no further questions at this time.  She will return for a follow up in 6 months per NCCN guidelines. Patient was encouraged to call with questions or concerns in the interim.

## 2020-09-21 NOTE — PATIENT INSTRUCTIONS
To Do: Please make sure that you give my business cards to your specialists, and ask them to send me copies of their notes (a bunch of them still think that Dr. Karyle Baumgarten is your PCP) Notes from your doctor: We are checking your thyroid levels, as well as your electrolytes and your A1c. I am not certain where your fatigue and excessive sweating is coming from. If I cannot find any reasons, we can consider sending you to endocrinology for their opinion. TESTING RESULTS Results will be released to [facility] [address] Normal results will be sent via mail. If you have questions about your results, please schedule a follow up appointment to discuss with your PCP. MEDICATION REFILLS Please allow at least 2 business days for refill requests to be addressed. Medication refills may be requested through your pharmacy. Refills will not be provided by the after hours/on call provider.

## 2020-09-22 PROBLEM — M46.20 OSTEOMYELITIS OF SPINE (HCC): Status: ACTIVE | Noted: 2019-09-09

## 2020-09-22 PROBLEM — C50.911 MALIGNANT NEOPLASM OF RIGHT BREAST (HCC): Status: ACTIVE | Noted: 2018-11-06

## 2020-09-22 PROBLEM — Z96.641 STATUS POST TOTAL HIP REPLACEMENT, RIGHT: Status: ACTIVE | Noted: 2020-03-12

## 2020-09-22 RX ORDER — CLOTRIMAZOLE 10 MG/1
LOZENGE ORAL; TOPICAL
COMMUNITY
End: 2020-10-13

## 2020-09-29 ENCOUNTER — TELEPHONE (OUTPATIENT)
Dept: FAMILY MEDICINE CLINIC | Age: 75
End: 2020-09-29

## 2020-09-29 DIAGNOSIS — Z94.0 HISTORY OF KIDNEY TRANSPLANT: ICD-10-CM

## 2020-09-29 DIAGNOSIS — R68.89 OTHER GENERAL SYMPTOMS AND SIGNS: ICD-10-CM

## 2020-09-29 DIAGNOSIS — D84.9 IMMUNOSUPPRESSED STATUS (HCC): ICD-10-CM

## 2020-09-29 DIAGNOSIS — R61 EXCESSIVE SWEATING: Primary | ICD-10-CM

## 2020-09-29 NOTE — TELEPHONE ENCOUNTER
Patient informed and lab appt scheduled    Future Appointments   Date Time Provider [place]   10/1/2020  8:00 AM LAB_[facility] AMB   10/13/2020  8:45 AM Stanley Mcneal MD [facility] AMB

## 2020-09-29 NOTE — TELEPHONE ENCOUNTER
Please call Abel Davila. Overall labs were reassuring, however would like to do 8am labs prior to putting in an endocrinology referral, as well as asking infectious disease to see her sooner than her planned December followup. (labs do need to be done in the morning, but they do not have to be fasting). Thanks.     To be drawn:  - cortisol, AM  - CRP  - ESR

## 2020-09-30 NOTE — TELEPHONE ENCOUNTER
Patient called and left message stating she called over to infectious disease to see about having her appointment moved up however they soonest they could get her in is Oct 27 however she couldn't take that since her foot surgery is on Oct 20th.      Patient is scheudled for labs to be done tomorrow    Future Appointments   Date Time Provider Liz [place]   10/1/2020  8:00 AM LAB_[facility] AMB   10/13/2020  8:45 AM Lb Mcneal MD [facility] BS AMB

## 2020-10-01 ENCOUNTER — HOSPITAL ENCOUNTER (OUTPATIENT)
Dept: LAB | Age: 75
Discharge: HOME | End: 2020-10-01
Payer: MEDICARE

## 2020-10-01 ENCOUNTER — APPOINTMENT (OUTPATIENT)
Dept: FAMILY MEDICINE CLINIC | Age: 75
End: 2020-10-01

## 2020-10-01 DIAGNOSIS — R68.89 OTHER GENERAL SYMPTOMS AND SIGNS: ICD-10-CM

## 2020-10-01 DIAGNOSIS — D84.9 IMMUNOSUPPRESSED STATUS (HCC): ICD-10-CM

## 2020-10-01 DIAGNOSIS — Z94.0 HISTORY OF KIDNEY TRANSPLANT: ICD-10-CM

## 2020-10-01 DIAGNOSIS — R59.0 LYMPHADENOPATHY, ANTERIOR CERVICAL: ICD-10-CM

## 2020-10-01 DIAGNOSIS — R61 EXCESSIVE SWEATING: ICD-10-CM

## 2020-10-01 LAB
CRP SERPL-MCNC: <0.3 MG/DL (ref 0–0.3)
ERYTHROCYTE [SEDIMENTATION RATE] IN BLOOD: 1 MM/HR (ref 0–30)

## 2020-10-01 PROCEDURE — 85652 RBC SED RATE AUTOMATED: CPT

## 2020-10-01 PROCEDURE — 82533 TOTAL CORTISOL: CPT

## 2020-10-01 PROCEDURE — 86140 C-REACTIVE PROTEIN: CPT

## 2020-10-01 PROCEDURE — 36415 COLL VENOUS BLD VENIPUNCTURE: CPT

## 2020-10-02 LAB — CORTIS AM PEAK SERPL-MCNC: 16 UG/DL (ref 4.3–22.45)

## 2020-10-13 ENCOUNTER — OFFICE VISIT (OUTPATIENT)
Dept: FAMILY MEDICINE CLINIC | Age: 75
End: 2020-10-13
Payer: MEDICARE

## 2020-10-13 ENCOUNTER — HOSPITAL ENCOUNTER (OUTPATIENT)
Dept: LAB | Age: 75
Discharge: HOME | End: 2020-10-13
Payer: MEDICARE

## 2020-10-13 ENCOUNTER — APPOINTMENT (OUTPATIENT)
Dept: FAMILY MEDICINE CLINIC | Age: 75
End: 2020-10-13

## 2020-10-13 VITALS
RESPIRATION RATE: 16 BRPM | HEART RATE: 55 BPM | TEMPERATURE: 97.2 F | DIASTOLIC BLOOD PRESSURE: 88 MMHG | WEIGHT: 178.8 LBS | BODY MASS INDEX: 30.52 KG/M2 | SYSTOLIC BLOOD PRESSURE: 160 MMHG | OXYGEN SATURATION: 100 % | HEIGHT: 64 IN

## 2020-10-13 DIAGNOSIS — Z94.0 HISTORY OF KIDNEY TRANSPLANT: ICD-10-CM

## 2020-10-13 DIAGNOSIS — I10 ESSENTIAL HYPERTENSION: ICD-10-CM

## 2020-10-13 DIAGNOSIS — I48.0 PAF (PAROXYSMAL ATRIAL FIBRILLATION) (HCC): ICD-10-CM

## 2020-10-13 DIAGNOSIS — Z01.818 PREOP EXAMINATION: Primary | ICD-10-CM

## 2020-10-13 DIAGNOSIS — Z01.818 PREOP EXAMINATION: ICD-10-CM

## 2020-10-13 DIAGNOSIS — D84.9 IMMUNOSUPPRESSED STATUS (HCC): ICD-10-CM

## 2020-10-13 DIAGNOSIS — N94.9 VAGINAL DISCOMFORT: ICD-10-CM

## 2020-10-13 DIAGNOSIS — I25.10 CORONARY ARTERY DISEASE INVOLVING NATIVE CORONARY ARTERY OF NATIVE HEART WITHOUT ANGINA PECTORIS: ICD-10-CM

## 2020-10-13 DIAGNOSIS — R61 EXCESSIVE SWEATING: ICD-10-CM

## 2020-10-13 DIAGNOSIS — Z79.01 CHRONIC ANTICOAGULATION: ICD-10-CM

## 2020-10-13 LAB
ALBUMIN SERPL-MCNC: 4 G/DL (ref 3.4–5)
ALBUMIN/GLOB SERPL: 1.3 {RATIO} (ref 0.8–1.7)
ALP SERPL-CCNC: 88 U/L (ref 45–117)
ALT SERPL-CCNC: 26 U/L (ref 13–56)
ANION GAP SERPL CALC-SCNC: 11 MMOL/L (ref 3–18)
AST SERPL-CCNC: 16 U/L (ref 10–38)
BASOPHILS # BLD: 0 K/UL (ref 0–0.1)
BASOPHILS NFR BLD: 1 % (ref 0–2)
BILIRUB SERPL-MCNC: 0.7 MG/DL (ref 0.2–1)
BILIRUB UR QL STRIP: NEGATIVE
BUN SERPL-MCNC: 14 MG/DL (ref 7–18)
BUN/CREAT SERPL: 18 (ref 12–20)
CALCIUM SERPL-MCNC: 9.7 MG/DL (ref 8.5–10.1)
CHLORIDE SERPL-SCNC: 111 MMOL/L (ref 100–111)
CO2 SERPL-SCNC: 21 MMOL/L (ref 21–32)
CREAT SERPL-MCNC: 0.8 MG/DL (ref 0.6–1.3)
DIFFERENTIAL METHOD BLD: ABNORMAL
EOSINOPHIL # BLD: 0.1 K/UL (ref 0–0.4)
EOSINOPHIL NFR BLD: 1 % (ref 0–5)
ERYTHROCYTE [DISTWIDTH] IN BLOOD BY AUTOMATED COUNT: 18.3 % (ref 11.6–14.5)
GLOBULIN SER CALC-MCNC: 3.2 G/DL (ref 2–4)
GLUCOSE SERPL-MCNC: 79 MG/DL (ref 74–99)
GLUCOSE UR-MCNC: NEGATIVE MG/DL
HCT VFR BLD AUTO: 41.1 % (ref 35–45)
HGB BLD-MCNC: 12.8 G/DL (ref 12–16)
KETONES P FAST UR STRIP-MCNC: NEGATIVE MG/DL
LYMPHOCYTES # BLD: 1.6 K/UL (ref 0.9–3.6)
LYMPHOCYTES NFR BLD: 25 % (ref 21–52)
MCH RBC QN AUTO: 23.3 PG (ref 24–34)
MCHC RBC AUTO-ENTMCNC: 31.1 G/DL (ref 31–37)
MCV RBC AUTO: 74.9 FL (ref 74–97)
MONOCYTES # BLD: 0.6 K/UL (ref 0.05–1.2)
MONOCYTES NFR BLD: 9 % (ref 3–10)
NEUTS SEG # BLD: 4.1 K/UL (ref 1.8–8)
NEUTS SEG NFR BLD: 64 % (ref 40–73)
PH UR STRIP: 7 [PH] (ref 4.6–8)
PLATELET # BLD AUTO: 276 K/UL (ref 135–420)
PMV BLD AUTO: 10.4 FL (ref 9.2–11.8)
POTASSIUM SERPL-SCNC: 4.1 MMOL/L (ref 3.5–5.5)
PROT SERPL-MCNC: 7.2 G/DL (ref 6.4–8.2)
PROT UR QL STRIP: NEGATIVE
RBC # BLD AUTO: 5.49 M/UL (ref 4.2–5.3)
SODIUM SERPL-SCNC: 143 MMOL/L (ref 136–145)
SP GR UR STRIP: 1.01 (ref 1–1.03)
UA UROBILINOGEN AMB POC: NORMAL (ref 0.2–1)
URINALYSIS CLARITY POC: CLEAR
URINALYSIS COLOR POC: YELLOW
URINE BLOOD POC: NORMAL
URINE LEUKOCYTES POC: NEGATIVE
URINE NITRITES POC: NEGATIVE
WBC # BLD AUTO: 6.4 K/UL (ref 4.6–13.2)

## 2020-10-13 PROCEDURE — G8510 SCR DEP NEG, NO PLAN REQD: HCPCS | Performed by: FAMILY MEDICINE

## 2020-10-13 PROCEDURE — G8417 CALC BMI ABV UP PARAM F/U: HCPCS | Performed by: FAMILY MEDICINE

## 2020-10-13 PROCEDURE — 1101F PT FALLS ASSESS-DOCD LE1/YR: CPT | Performed by: FAMILY MEDICINE

## 2020-10-13 PROCEDURE — 99214 OFFICE O/P EST MOD 30 MIN: CPT | Performed by: FAMILY MEDICINE

## 2020-10-13 PROCEDURE — 85025 COMPLETE CBC W/AUTO DIFF WBC: CPT

## 2020-10-13 PROCEDURE — G9231 DOC ESRD DIA TRANS PREG: HCPCS | Performed by: FAMILY MEDICINE

## 2020-10-13 PROCEDURE — G8427 DOCREV CUR MEDS BY ELIG CLIN: HCPCS | Performed by: FAMILY MEDICINE

## 2020-10-13 PROCEDURE — G0463 HOSPITAL OUTPT CLINIC VISIT: HCPCS | Performed by: FAMILY MEDICINE

## 2020-10-13 PROCEDURE — G8536 NO DOC ELDER MAL SCRN: HCPCS | Performed by: FAMILY MEDICINE

## 2020-10-13 PROCEDURE — G9711 PT HX TOT COL OR COLON CA: HCPCS | Performed by: FAMILY MEDICINE

## 2020-10-13 PROCEDURE — 80053 COMPREHEN METABOLIC PANEL: CPT

## 2020-10-13 PROCEDURE — 36415 COLL VENOUS BLD VENIPUNCTURE: CPT

## 2020-10-13 PROCEDURE — 87798 DETECT AGENT NOS DNA AMP: CPT

## 2020-10-13 PROCEDURE — 1090F PRES/ABSN URINE INCON ASSESS: CPT | Performed by: FAMILY MEDICINE

## 2020-10-13 PROCEDURE — 81003 URINALYSIS AUTO W/O SCOPE: CPT | Performed by: FAMILY MEDICINE

## 2020-10-13 RX ORDER — HYDROCORTISONE ACETATE 25 MG/1
SUPPOSITORY RECTAL
COMMUNITY
Start: 2020-10-02

## 2020-10-13 NOTE — PATIENT INSTRUCTIONS
To Do: - from your cardiologist, we need to know what they would like you to do with your eliquis prior to your foot surgery, as well as their recommendations on restarting your eliquis after surgery - we will send your vaginal swab off to the lab to see if it \"catches\" your types of yeast 
 
 
Notes from your doctor: 
- we will put the consideration for an endocrinology referral \"on the backburner\" for now, until you are through your foot surgery.   
- good luck with your foot surgery! TESTING RESULTS ? Results will be released to [address] ? Normal results will be sent via mail. ? If you have questions about your results, please schedule a follow up appointment to discuss with your PCP. ? [facility] Help Desk #[phone].   
? Results of tests performed at outside facilities / laboratories will not appear in the [facility]. MEDICATION REFILLS ? Please request medication refills through your pharmacy, to ensure the correct pharmacy is used. ? Please allow at least 2 business days for refill requests to be addressed. ? Refills will not be provided by the after hours/on call provider.

## 2020-10-13 NOTE — PROGRESS NOTES
Jovita Díaz is a 77 y.o. female (: 1945) presenting to address:    Chief Complaint   Patient presents with    Pre-op Exam     Left foot sugery 10/20/20      Patient is scheduled to see Dr. Isabella Beltran Cardiology on 10/15/20. Vitals:    10/13/20 0831   BP: (!) 153/76   Pulse: (!) 55   Resp: 16   Temp: 97.2 °F (36.2 °C)   TempSrc: Temporal   SpO2: 100%   Weight: 178 lb 12.8 oz (81.1 kg)   Height: 5' 4\" (1.626 m)   PainSc:   0 - No pain       Hearing/Vision:   No exam data present    Learning Assessment:     Learning Assessment 10/23/2019   PRIMARY LEARNER Patient   HIGHEST LEVEL OF EDUCATION - PRIMARY LEARNER  2 YEARS OF COLLEGE   BARRIERS PRIMARY LEARNER NONE   CO-LEARNER CAREGIVER No   PRIMARY LANGUAGE ENGLISH   LEARNER PREFERENCE PRIMARY LISTENING   ANSWERED BY stephenie   RELATIONSHIP SELF     Depression Screening:     3 most recent PHQ Screens 10/13/2020   Little interest or pleasure in doing things Not at all   Feeling down, depressed, irritable, or hopeless Not at all   Total Score PHQ 2 0     Fall Risk Assessment:     Fall Risk Assessment, last 12 mths 10/13/2020   Able to walk? Yes   Fall in past 12 months? No     Abuse Screening:     Abuse Screening Questionnaire 10/13/2020   Do you ever feel afraid of your partner? N   Are you in a relationship with someone who physically or mentally threatens you? N   Is it safe for you to go home? Y     Coordination of Care Questionaire:   1. Have you been to the ER, urgent care clinic since your last visit? Hospitalized since your last visit? NO    2. Have you seen or consulted any other health care providers outside of the [address] [place] [address] since your last visit? Include any pap smears or colon screening. YES 10/12/20 [facility]; 10/12/20 [facility] Dr. Rhea Pompa     Advanced Directive:   1. Do you have an Advanced Directive? NO    2. Would you like information on Advanced Directives?  NO

## 2020-10-13 NOTE — PROGRESS NOTES
[facility]  Perioperative Risk Assessment    Patient:  Luis Albertooscarwillie Myke  Patient's :  1945  Referring Physician:  Felipe Ward  Today's Date: 10/13/2020    Assessment:       ICD-10-CM ICD-9-CM   1. Preop examination  Z01.818 V72.84   2. Coronary artery disease involving native coronary artery of native heart without angina pectoris  I25.10 414.01   3. PAF (paroxysmal atrial fibrillation) ([facility])  I48.0 427.31   4. Chronic anticoagulation  Z79.01 V58.61   5. Essential hypertension  I10 401.9   6. Immunosuppressed status ([address]  D84.9 279.9   7. History of kidney transplant  Z94.0 V42.0   8. Vaginal discomfort  N94.9 625.9   9. Excessive sweating  R61 780.8       Plan:     Hypertensive today, however reports normal BPs at home. ## Perioperative cardiac risk assessment & perioperative anticoagulation plans deferred to cardiology - has appt with OSWALDO Sheldon on 10/15/2020 ##    > CBC and CMP ordered today as current labs will be \"34days old\" at the time of date of scheduled surgery (addendum below with 10/13/2020 labs!)  > UA benign today        Other issues addressed today:    # vaginal discomfort  Will be starting fluconazole per ID  > NuSwab collected today    # excessive sweating / fatigue / non-specific complaints  Normal recent AM cortisol, TFTs  Pt interested in possible endocrinology referral  At this time, amenable to watchful waiting and reassess after foot surgery     Will request last office notes GYN and nephrology. CC: cardiology      Orders Placed This Encounter    CBC WITH AUTOMATED DIFF    METABOLIC PANEL, COMPREHENSIVE    NUSWAB VAGINITIS PLUS    AMB POC URINALYSIS DIP STICK AUTO W/O MICRO         Management plan & patient instructions reviewed with Jose Antonio Stringer, who voiced understanding.     Ted Avelar MD  Internal Medicine, Family Medicine & Sports Medicine  10/13/2020, 8:26 AM  [address] at [place] [address] [address], [address]  Phone [phone]  Fax [phone]  [facility]    Faxed to [phone] on 10/13/2020 @ 4:30pm.      Subjective:   (4+ elements)     History of Present Illness:  Lorna Dowell is a 77 y.o. female who I was asked to evaluate for perioperative risk assessment. Planned date of surgery: 10/20/2020  Planned surgical procedure: Modified Francisco bunionectomy, second, third and fifth metatarsal shortening osteotomy, fourth and fifth hammertoe repair left foot  Surgeon: Shane Hassan D.P.M. Type of anesthesia: General anesthesia    Evaluation of major predictors of cardiac complications:   The nature of the surgery is: LOW   Patient HAS history of ischemic heart disease.  Patient does NOT have history of diabetes requiring treatment with insulin, heart failure, cerebrovascular disease, preoperative serum creatinine > 2.0. Has ongoing diffuse sweating. Has weaned off of PPI and on to H2B with GI, and notes \"feeling overall a bit better\". Saw ID yesterday via telemedicine, and was prescribe fluconazole (since now she is no longer on coumadin, and has successfully transition to eliquis) which she hasn't yet started. Has just finished a vaginal yeast treatment with GYn on Friday 10/9/2020, however still has burning / itching. Has upcoming appt with cardiology on 10/15/2020.         PMH, PSH, Social Hx, Family Hx, Meds and Allergies     Past Medical History:   Diagnosis Date    Arthropathy     Atrophic vaginitis     Bladder tumor     Coronary artery disease     Crohn disease ([address]     DJD (degenerative joint disease)     Esophageal reflux     Female cystocele     Fibrocystic breast     Fibromyalgia     GERD (gastroesophageal reflux disease)     History of kidney transplant     IBS (irritable bowel syndrome)     Lower urinary tract symptoms (LUTS)     Nephrogenic adenoma of bladder     Nocturia     Osteopenia     Pelvic organ prolapse quantification stage 1 cystocele     Renal failure     Shingles     [facility]ren's [facility]     Sleep apnea       Past Surgical History:   Procedure Laterality Date    HX BACK SURGERY  2014    laminectomy T10/11    HX BACK SURGERY  2019    hardware removed    HX BACK SURGERY  2014    laminectomy RIGHT L5/S1    HX BILATERAL MASTECTOMY Bilateral 2018    HX BREAST LUMPECTOMY  1998    HX CERVICAL FUSION  2007    HX COLECTOMY  1984    iliocolectomy    HX CORONARY STENT PLACEMENT  ;    HX HEART CATHETERIZATION  2008    HX HEMORRHOIDECTOMY  1993    HX HIP REPLACEMENT Right 2020    HX KNEE REPLACEMENT Left     HX KNEE REPLACEMENT  3/2015    left    HX LIPECTOMY  2001    HX MOHS PROCEDURES  1996    HX PARATHYROIDECTOMY      HX RENAL TRANSPLANT      HX TUBAL LIGATION        Social History     Socioeconomic History    Marital status: UNKNOWN     Spouse name: Not on file    Number of children: Not on file    Years of education: Not on file    Highest education level: Not on file   Occupational History    Not on file   Social Needs    Financial resource strain: Not on file    Food insecurity     Worry: Not on file     Inability: Not on file    Transportation needs     Medical: Not on file     Non-medical: Not on file   Tobacco Use    Smoking status: Former Smoker     Start date:      Last attempt to quit: 2000     Years since quittin.6    Smokeless tobacco: Never Used   Substance and Sexual Activity    Alcohol use: No     Alcohol/week: 0.0 standard drinks    Drug use: No    Sexual activity: Not on file   Lifestyle    Physical activity     Days per week: Not on file     Minutes per session: Not on file    Stress: Not on file   Relationships    Social connections     Talks on phone: Not on file     Gets together: Not on file     Attends [employer] service: Not on file     Active member of club or organization: Not on file     Attends meetings of clubs or organizations: Not on file     Relationship status: Not on file    Intimate partner violence     Fear of current or ex partner: Not on file     Emotionally abused: Not on file     Physically abused: Not on file     Forced sexual activity: Not on file   Other Topics Concern    Not on file   Social History Narrative    Oct 2019:  x 38 years. Used to work for [employer]. Received kidney transplant in 1992. Family History   Problem Relation Age of Onset    Ovarian Cancer Mother     Diabetes Sister     Hypertension Sister     Diabetes Brother     Hypertension Brother     Hypertension Brother     Diabetes Sister      Medication Sig    Anucort-HC 25 mg supp     apixaban (Eliquis) 5 mg tablet Take 5 mg by mouth two (2) times a day.  doxycycline (VIBRAMYCIN) 100 mg capsule take 1 capsule by mouth twice a day    desonide (TRIDESILON) 0.05 % cream APPLY TO AFFECTED AREA OF FACE TWICE A DAY AS NEEDED    ketoconazole (NIZORAL) 2 % topical cream apply to affected area ON THE FACE twice a day    pravastatin (PRAVACHOL) 20 mg tablet take 1 tablet by mouth once daily at bedtime (Patient taking differently: Take 20 mg by mouth every Monday, Wednesday, Friday.)    metoprolol tartrate (LOPRESSOR) 50 mg tablet Take 1 Tab by mouth two (2) times a day.  acetaminophen/diphenhydramine (TYLENOL PM EXTRA STRENGTH PO) Take 1 Tab by mouth nightly as needed.  carboxymethylcellulose sodium (REFRESH OP) Apply  to eye.  cholecalciferol (VITAMIN D3) (1000 Units /25 mcg) tablet Take 7,000 Units by mouth every three (3) days.  dilTIAZem SR (CARDIZEM SR) 60 mg SR capsule Take 1 Cap by mouth two (2) times a day.  krill-om-3-dha-epa-phospho-ast (KRILL OIL) 1,432-184-15-80 mg cap Take 1,000 mg by mouth daily.  methylcellulose (CITRUCEL) 500 mg tablet Take 500 mg by mouth daily.  furosemide (LASIX) 40 mg tablet Take 40 mg by mouth daily as needed.     losartan (COZAAR) 100 mg tablet Take 100 mg by mouth nightly.  multivit-min/FA/lutein/zeaxant (MACULAR VITAMIN PO) Take 1 Tab by mouth daily.  Lactobacillus acidophilus (PROBIOTIC PO) Take 1 Cap by mouth daily.  ketotifen (ZADITOR) 0.025 % (0.035 %) ophthalmic solution Administer 1 Drop to both eyes two (2) times daily as needed.  acyclovir (ZOVIRAX) 5 % ointment Apply 1.5 Each to affected area five (5) times daily. As needed    magnesium oxide (MAG-OX) 400 mg tablet Take 800 mg by mouth two (2) times a day.  potassium chloride (K-DUR, KLOR-CON) 20 mEq tablet Take 20 mEq by mouth daily.  predniSONE (DELTASONE) 5 mg tablet Take 5 mg by mouth daily.  mycophenolate sodium (MYFORTIC) 360 mg delayed release tablet Take 720 mg by mouth two (2) times a day. Allergies   Allergen Reactions    Atenolol Myalgia    Atorvastatin Myalgia     Other reaction(s): Unknown    Azathioprine Other (comments)     Other reaction(s): Unknown    Iodinated Contrast Media Other (comments)     Had kidney transplant      Ketorolac Tromethamine Other (comments) and Itching     Other reaction(s): Unknown       Review of Systems:       Review of Systems   Constitutional: Negative for chills and fever. HENT: Negative for ear pain and sore throat.         + thrush issues, ongoing   Respiratory: Negative for cough and shortness of breath. Cardiovascular: Negative for chest pain and palpitations. Gastrointestinal: Negative for abdominal pain. Genitourinary: Negative for dysuria.        + vaginal \"burning\", chronic   Musculoskeletal: Negative for myalgias. Skin: Negative for rash. Neurological: Negative for speech change, focal weakness and headaches. Endo/Heme/Allergies: Does not bruise/bleed easily. + fatigue and \"constant\" diffuse sweating   Psychiatric/Behavioral: Negative for depression. The patient is not nervous/anxious and does not have insomnia.           Objective:       VS:    Visit Vitals  BP (!) 160/88 (BP 1 Location: Left arm, BP Patient Position: Sitting) Comment: manual   Pulse (!) 55   Temp 97.2 °F (36.2 °C) (Temporal)   Resp 16   Ht 5' 4\" (1.626 m)   Wt 178 lb 12.8 oz (81.1 kg)   SpO2 100%   BMI 30.69 kg/m²       Physical Exam  Nursing note reviewed. Constitutional:       Appearance: She is well-developed. She is not diaphoretic. HENT:      Head: Normocephalic and atraumatic. Right Ear: Tympanic membrane, ear canal and external ear normal.      Left Ear: Tympanic membrane, ear canal and external ear normal.      Ears:      Comments: Mask in place  Neck:      Musculoskeletal: Neck supple. Thyroid: No thyromegaly. Cardiovascular:      Rate and Rhythm: Normal rate and regular rhythm. Heart sounds: No murmur. Pulmonary:      Effort: Pulmonary effort is normal.      Breath sounds: Normal breath sounds. No wheezing or rales. Musculoskeletal: Normal range of motion. Lymphadenopathy:      Cervical: No cervical adenopathy. Skin:     General: Skin is warm and dry. Neurological:      Mental Status: She is alert and oriented to person, place, and time. Cranial Nerves: No cranial nerve deficit. Coordination: Coordination normal.   Psychiatric:         Behavior: Behavior normal.         Thought Content: Thought content normal.         Judgment: Judgment normal.         Records Review:     Cardiology (8/18/2020):    Chief complaint: CAD/PAF/chronic anticaog/SOB/htn/dyslipidemia     Impression: Rahul Salazar is a 77 y.o. female seen for   (I48.0) PAF (paroxysmal atrial fibrillation) - Plan: EKG (17768)    (Z79.01) Chronic anticoagulation    (I25.10) Coronary artery disease involving native coronary artery of native heart without angina pectoris    (Z95.5) Stented coronary artery    (E78.5) Dyslipidemia    (I10) Essential hypertension    Recommendations: CAD/PAF/chronic anticaog/SOB/htn/dyslipidemia: The exact cause of her symptoms is unclear. As noted her EKG is reassuring.  Her physical exam demonstrates clear lungs without any wheezes or rales. She is going to follow-up with ID to obtain screening blood work. If her symptoms persist, she may benefit from going to the ED for for a more detailed evaluation to include a chest x-ray and additional blood work. No change in medications for now. Return in about 3 months (around 11/18/2020). Instructed to call the office prior to next visit if needed. SUBJECTIVE/INTERVAL EVENTS: CAD/PAF/chronic anticaog/SOB/htn/dyslipidemia: 5 nights ago she had some palpitations that she thought may be an episode of A. fib. They resolve spontaneously and have not recurred. However, since that time she has had a number of symptoms to include shortness of breath, left-sided/left lateral chest discomfort, a general sense of not feeling well, a lot of anxiety, and some mild edema. She denies any fever or chills. No cough. No sick exposures. EKG today in the office is reassuring. Sinus rhythm minimal T wave changes in the lateral leads. No ST segment changes. Overall it appears a benign EKG. No orthopnea, or PND. No dizziness, lightheadedness, or syncope. No edema. Review of systems: No fever, chills, nausea, vomiting. No bleeding.          Labwork and Ancillary Studies     Lab Results   Component Value Date/Time    Color (UA POC) Yellow 10/13/2020 09:09 AM    Clarity (UA POC) Clear 10/13/2020 09:09 AM    Glucose (UA POC) Negative 10/13/2020 09:09 AM    Bilirubin (UA POC) Negative 10/13/2020 09:09 AM    Ketones (UA POC) Negative 10/13/2020 09:09 AM    Specific gravity (UA POC) 1.015 10/13/2020 09:09 AM    Blood (UA POC) Trace 10/13/2020 09:09 AM    pH (UA POC) 7.0 10/13/2020 09:09 AM    Protein (UA POC) Negative 10/13/2020 09:09 AM    Urobilinogen (UA POC) 0.2 mg/dL 10/13/2020 09:09 AM    Nitrites (UA POC) Negative 10/13/2020 09:09 AM    Leukocyte esterase (UA POC) Negative 10/13/2020 09:09 AM       Lab Results   Component Value Date/Time    WBC 7.5 09/21/2020 10:07 AM    HGB 12.5 09/21/2020 10:07 AM    HCT 39.4 09/21/2020 10:07 AM    PLATELET 656 87/68/7463 10:07 AM    MCV 73.1 (L) 09/21/2020 10:07 AM       Lab Results   Component Value Date/Time    Sodium 144 09/21/2020 10:07 AM    Potassium 4.2 09/21/2020 10:07 AM    Chloride 111 09/21/2020 10:07 AM    CO2 24 09/21/2020 10:07 AM    Anion gap 9 09/21/2020 10:07 AM    Glucose 90 09/21/2020 10:07 AM    BUN 18 09/21/2020 10:07 AM    Creatinine 0.86 09/21/2020 10:07 AM    BUN/Creatinine ratio 21 (H) 09/21/2020 10:07 AM    GFR est AA >60 09/21/2020 10:07 AM    GFR est non-AA >60 09/21/2020 10:07 AM    Calcium 9.5 09/21/2020 10:07 AM    Bilirubin, total 0.2 09/21/2020 10:07 AM    Alk. phosphatase 96 09/21/2020 10:07 AM    Protein, total 7.2 09/21/2020 10:07 AM    Albumin 4.0 09/21/2020 10:07 AM    Globulin 3.2 09/21/2020 10:07 AM    A-G Ratio 1.3 09/21/2020 10:07 AM    ALT (SGPT) 25 09/21/2020 10:07 AM       Other pertinent diagnostic procedures include:  EKG (08/19/2020): sinus rhythm, rate 56         Addendum (10/15/2020):     Updated CBC and CMP resulted below. Lab Results   Component Value Date/Time    WBC 6.4 10/13/2020 09:09 AM    HGB 12.8 10/13/2020 09:09 AM    HCT 41.1 10/13/2020 09:09 AM    PLATELET 819 24/67/9684 09:09 AM    MCV 74.9 10/13/2020 09:09 AM       Lab Results   Component Value Date/Time    Sodium 143 10/13/2020 09:09 AM    Potassium 4.1 10/13/2020 09:09 AM    Chloride 111 10/13/2020 09:09 AM    CO2 21 10/13/2020 09:09 AM    Anion gap 11 10/13/2020 09:09 AM    Glucose 79 10/13/2020 09:09 AM    BUN 14 10/13/2020 09:09 AM    Creatinine 0.80 10/13/2020 09:09 AM    BUN/Creatinine ratio 18 10/13/2020 09:09 AM    GFR est AA >60 10/13/2020 09:09 AM    GFR est non-AA >60 10/13/2020 09:09 AM    Calcium 9.7 10/13/2020 09:09 AM    Bilirubin, total 0.7 10/13/2020 09:09 AM    Alk.  phosphatase 88 10/13/2020 09:09 AM    Protein, total 7.2 10/13/2020 09:09 AM    Albumin 4.0 10/13/2020 09:09 AM    Globulin 3.2 10/13/2020 09:09 AM    A-G Ratio 1.3 10/13/2020 09:09 AM    ALT (SGPT) 26 10/13/2020 09:09 AM       Faxed to [phone] on 10/15/2020 @ 8:25am.    Marilee Schwartz MD  Internal Medicine, Family Medicine & Sports Medicine  10/15/2020 8:19 AM

## 2020-10-17 LAB
A VAGINAE DNA VAG QL NAA+PROBE: NORMAL SCORE
BVAB2 DNA VAG QL NAA+PROBE: NORMAL SCORE
C ALBICANS DNA VAG QL NAA+PROBE: NEGATIVE
C GLABRATA DNA VAG QL NAA+PROBE: NEGATIVE
C TRACH RRNA SPEC QL NAA+PROBE: NEGATIVE
MEGA1 DNA VAG QL NAA+PROBE: NORMAL SCORE
N GONORRHOEA RRNA SPEC QL NAA+PROBE: NEGATIVE
SPECIMEN SOURCE: NORMAL
T VAGINALIS RRNA SPEC QL NAA+PROBE: NEGATIVE

## 2020-11-04 ENCOUNTER — TELEPHONE (OUTPATIENT)
Dept: FAMILY MEDICINE CLINIC | Age: 75
End: 2020-11-04

## 2020-11-04 NOTE — TELEPHONE ENCOUNTER
Patient called stating that she is not satisfied with the infectious disease office she is currently going to. Pt would like to know if dr. Kayce Melo could refer her elsewhere because she feels as though she isn't receiving proper care and they are not following up with her accordingly. Pt states that she would not like to see anyone else in that office. Please advise.

## 2020-11-10 NOTE — TELEPHONE ENCOUNTER
Please call Jovita Díaz. \"Dr. Jared Diamond recommends that you discuss your desire for a new infectious disease physician with Dr. Maria T Phillips, since as your kidney doctor, it is the most important that he has a good working relationship with your infectious disease physician, given your transplanted kidney\"      Thanks.

## 2020-11-13 ENCOUNTER — VIRTUAL VISIT (OUTPATIENT)
Dept: FAMILY MEDICINE CLINIC | Age: 75
End: 2020-11-13
Payer: MEDICARE

## 2020-11-13 DIAGNOSIS — D84.9 IMMUNOSUPPRESSED STATUS (HCC): ICD-10-CM

## 2020-11-13 DIAGNOSIS — R61 EXCESSIVE SWEATING: ICD-10-CM

## 2020-11-13 DIAGNOSIS — E04.1 THYROID NODULE: Primary | ICD-10-CM

## 2020-11-13 DIAGNOSIS — Z79.52 LONG TERM CURRENT USE OF SYSTEMIC STEROIDS: ICD-10-CM

## 2020-11-13 DIAGNOSIS — R25.1 TREMOR: ICD-10-CM

## 2020-11-13 DIAGNOSIS — T86.10 RENAL TRANSPLANT DISORDER: ICD-10-CM

## 2020-11-13 DIAGNOSIS — E89.2 S/P PARATHYROIDECTOMY (HCC): ICD-10-CM

## 2020-11-13 DIAGNOSIS — M35.00 SJOGREN'S SYNDROME, WITH UNSPECIFIED ORGAN INVOLVEMENT (HCC): ICD-10-CM

## 2020-11-13 PROCEDURE — 99443 PR PHYS/QHP TELEPHONE EVALUATION 21-30 MIN: CPT | Performed by: FAMILY MEDICINE

## 2020-11-13 NOTE — PROGRESS NOTES
Note to patient:  The purpose of this note is to communicate optimally with the other physicians / APCs involved in your care. It is written using standard medical terminology. If you have questions regarding the details of the note, please contact my office to schedule an appointment to address your questions. [facility], [place]  Primary Care Office Visit - Telemedicine Problem-Oriented Note    Consent: Denver Enamorado, who was seen by synchronous (real-time) audio only technology, and/or her healthcare decision maker, is aware that this patient-initiated, Telehealth encounter on 11/13/2020 is a billable service, with coverage as determined by her insurance carrier. She is aware that she may receive a bill and has provided verbal consent to proceed: Yes. This service was provided through telehealth via [employer]. me, both the Patient Home and [facility], [place]. Assessment & Plan:       ICD-10-CM ICD-9-CM   1. Thyroid nodule  E04.1 241.0   2. S/P parathyroidectomy  Z98.890 V45.89   3. Excessive sweating  R61 780.8   4. Long term current use of systemic steroids  Z79.52 V58.65   5. Immunosuppressed status ([facility]  D84.9 279.9   6. Renal transplant disorder  T86.10 996.81   7. Sjogren's syndrome, with unspecified organ involvement ([facility]  M35.00 710.2   8.  Tremor  R25.1 781.0         # excessive sweating / fatigue / non-specific complaints  Ongoing, and now is mentioning  # hx of thyroid nodules   That has not been re-evaled in \"quite some time\"  -  Normal recent AM cortisol, TFTs  - Since now post-op for foot surgery, is interested in pursuing endo referral    # tremor  Ongoing, has upcoming appt with neurology      Orders Placed This Encounter    US THYROID/PARATHYROID/SOFT TISS     Standing Status:   Future     Standing Expiration Date:   12/13/2021    REFERRAL TO ENDOCRINOLOGY     Referral Priority:   Routine     Referral Type:   Consultation     Referral Reason:   Specialty Services Required     Referred to Provider:   Viky Pan MD     Number of Visits Requested:   1       Tot tel time = 21min      712  Subjective:   Conrado Shelton is a 77 y.o. female who was seen for Excessive Sweating; Tremors; and Thyroid Problem (hx of nodules)    Last visit: in-person 10/13/2020    Notes recent ED visit on 11/11/2020.  2wks of diarrhea. Was told it \"could possibly be from long term antibiotic use. \"  Is working on getting a new infectious disease doctor. Has upcoming initial consultation with neurology (Dr. Claudetta Danes). Was scheduled for earlier, but was pushed back. Is currently in fx boot s/p podiatric surgery. Reports ongoing tremor. Asks if this is related to the nerve dysfunction that was found on EMG by neurosurgery (Dr. Norm Pearson). Also notes that she had hx of thyroid nodules, but \"no one has checked on them in a while\". \"could that be causing the tremor? Or the sweating? How was my recent bloodwork? \"      Prior to Admission medications    Medication Sig Start Date End Date Taking? Authorizing Provider   Anucort-HC 25 mg supp  10/2/20   Provider, Historical   apixaban (Eliquis) 5 mg tablet Take 5 mg by mouth two (2) times a day. Provider, Historical   doxycycline (VIBRAMYCIN) 100 mg capsule take 1 capsule by mouth twice a day 9/9/20   Provider, Historical   desonide (TRIDESILON) 0.05 % cream APPLY TO AFFECTED AREA OF FACE TWICE A DAY AS NEEDED 6/29/20   Provider, Historical   ketoconazole (NIZORAL) 2 % topical cream apply to affected area ON THE FACE twice a day 9/17/20   Provider, Historical   pravastatin (PRAVACHOL) 20 mg tablet take 1 tablet by mouth once daily at bedtime  Patient taking differently: Take 20 mg by mouth every Monday, Wednesday, Friday. 7/28/20   Carlota Mcneal MD   metoprolol tartrate (LOPRESSOR) 50 mg tablet Take 1 Tab by mouth two (2) times a day.  6/28/20   Carlota Mcneal MD   acetaminophen/diphenhydramine (TYLENOL PM EXTRA STRENGTH PO) Take 1 Tab by mouth nightly as needed. Provider, Historical   carboxymethylcellulose sodium (REFRESH OP) Apply  to eye. Provider, Historical   cholecalciferol (VITAMIN D3) (1000 Units /25 mcg) tablet Take 7,000 Units by mouth every three (3) days. Provider, Historical   dilTIAZem SR (CARDIZEM SR) 60 mg SR capsule Take 1 Cap by mouth two (2) times a day. 7/16/19   Provider, Historical   krill-om-3-dha-epa-phospho-ast (KRILL OIL) 1,899-822-86-80 mg cap Take 1,000 mg by mouth daily. Provider, Historical   methylcellulose (CITRUCEL) 500 mg tablet Take 500 mg by mouth daily. Provider, Historical   furosemide (LASIX) 40 mg tablet Take 40 mg by mouth daily as needed. Provider, Historical   losartan (COZAAR) 100 mg tablet Take 100 mg by mouth nightly. 7/24/19   Provider, Historical   multivit-min/FA/lutein/zeaxant (MACULAR VITAMIN PO) Take 1 Tab by mouth daily. Provider, Historical   Lactobacillus acidophilus (PROBIOTIC PO) Take 1 Cap by mouth daily. Provider, Historical   ketotifen (ZADITOR) 0.025 % (0.035 %) ophthalmic solution Administer 1 Drop to both eyes two (2) times daily as needed. Provider, Historical   acyclovir (ZOVIRAX) 5 % ointment Apply 1.5 Each to affected area five (5) times daily. As needed    Provider, Historical   magnesium oxide (MAG-OX) 400 mg tablet Take 800 mg by mouth two (2) times a day. Provider, Historical   potassium chloride (K-DUR, KLOR-CON) 20 mEq tablet Take 20 mEq by mouth daily. Provider, Historical   predniSONE (DELTASONE) 5 mg tablet Take 5 mg by mouth daily. Provider, Historical   mycophenolate sodium (MYFORTIC) 360 mg delayed release tablet Take 720 mg by mouth two (2) times a day.     Provider, Historical     Allergies   Allergen Reactions    Atenolol Myalgia    Atorvastatin Myalgia     Other reaction(s): Unknown    Azathioprine Other (comments)     Other reaction(s): Unknown    Iodinated Contrast Media Other (comments)     Had kidney transplant      Ketorolac Tromethamine Other (comments) and Itching     Other reaction(s): Unknown       Patient Active Problem List    Diagnosis Date Noted    Status post total hip replacement, right 03/12/2020    Other osteoporosis without current pathological fracture 02/05/2020    Candida esophagitis ([address] 02/05/2020    Long term current use of systemic steroids 10/29/2019    Osteomyelitis of spine ([address] 09/09/2019    Renal transplant disorder 08/16/2019    Malignant neoplasm of right breast ([address] 11/06/2018    Ductal carcinoma in situ (DCIS) of right breast 10/02/2018    Female cystocele 08/15/2017    Chronic anticoagulation 05/02/2017    Immunosuppressed status ([address] 12/19/2016    PAF (paroxysmal atrial fibrillation) ([address] 11/26/2016    Prediabetes 11/25/2016    Coronary artery disease involving native coronary artery of native heart without angina pectoris 11/25/2016    Thoracic spondylosis without myelopathy 05/26/2016    Stented coronary artery 02/24/2015    Crohn's disease of large intestine without complication ([address] 21/62/6825    S/P parathyroidectomy 03/26/2014    Bladder tumor 05/15/2013    Cervical spondylosis without myelopathy 03/12/2013    Dyslipidemia 12/17/2008    Essential hypertension 12/17/2008    Sjogren's syndrome ([address] 12/17/2008    History of kidney transplant 12/17/2008     Past Medical History:   Diagnosis Date    Arthropathy     Atrial fibrillation with RVR ([address] 11/26/2016    Atrophic vaginitis     Bladder tumor     Coronary artery disease     Crohn disease ([address]     DJD (degenerative joint disease)     Esophageal reflux     Female cystocele     Fibrocystic breast     Fibromyalgia     GERD (gastroesophageal reflux disease)     History of kidney transplant     IBS (irritable bowel syndrome)     Lower urinary tract symptoms (LUTS)     Nephrogenic adenoma of bladder     Nocturia     Osteopenia     Pelvic organ prolapse quantification stage 1 cystocele     Renal failure     Shingles     Sjogren's syndrome ([facility]     Sleep apnea      Past Surgical History:   Procedure Laterality Date    HX BACK SURGERY  2014    laminectomy T10/11    HX BACK SURGERY  2019    hardware removed    HX BACK SURGERY  2014    laminectomy RIGHT L5/S1    HX BILATERAL MASTECTOMY Bilateral 2018    HX BREAST LUMPECTOMY  1998    HX CERVICAL FUSION  2007    HX COLECTOMY  1984    iliocolectomy    HX CORONARY STENT PLACEMENT  ;    HX HEART CATHETERIZATION  2008    HX HEMORRHOIDECTOMY  1993    HX HIP REPLACEMENT Right 2020    HX KNEE REPLACEMENT Left     HX KNEE REPLACEMENT  3/2015    left    HX LIPECTOMY  2001    HX MOHS PROCEDURES  1996    HX PARATHYROIDECTOMY      HX RENAL TRANSPLANT      HX TUBAL LIGATION       Family History   Problem Relation Age of Onset    Ovarian Cancer Mother     Diabetes Sister     Hypertension Sister     Diabetes Brother     Hypertension Brother     Hypertension Brother     Diabetes Sister      Social History     Tobacco Use    Smoking status: Former Smoker     Start date:      Last attempt to quit: 2000     Years since quittin.7    Smokeless tobacco: Never Used   Substance Use Topics    Alcohol use: No     Alcohol/week: 0.0 standard drinks       Review of Systems   Constitutional: Positive for malaise/fatigue. Skin: Positive for itching and rash. Endo/Heme/Allergies:        + sweating; \"my neck lymph nodes are always swollen\"           Objective:   Vital Signs: (As obtained by patient/caregiver at home)  There were no vitals taken for this visit.   Physical Exam  Constitutional:       Comments: ( audio only - physical exam not performed )         Labs / Results:     Lab Results   Component Value Date/Time    WBC 6.4 10/13/2020 09:09 AM    HGB 12.8 10/13/2020 09:09 AM    HCT 41.1 10/13/2020 09:09 AM    PLATELET 420  09:09 AM    MCV 74.9 10/13/2020 09:09 AM       Lab Results   Component Value Date/Time    Sodium 143 10/13/2020 09:09 AM    Potassium 4.1 10/13/2020 09:09 AM    Chloride 111 10/13/2020 09:09 AM    CO2 21 10/13/2020 09:09 AM    Anion gap 11 10/13/2020 09:09 AM    Glucose 79 10/13/2020 09:09 AM    BUN 14 10/13/2020 09:09 AM    Creatinine 0.80 10/13/2020 09:09 AM    BUN/Creatinine ratio 18 10/13/2020 09:09 AM    GFR est AA >60 10/13/2020 09:09 AM    GFR est non-AA >60 10/13/2020 09:09 AM    Calcium 9.7 10/13/2020 09:09 AM    Bilirubin, total 0.7 10/13/2020 09:09 AM    Alk. phosphatase 88 10/13/2020 09:09 AM    Protein, total 7.2 10/13/2020 09:09 AM    Albumin 4.0 10/13/2020 09:09 AM    Globulin 3.2 10/13/2020 09:09 AM    A-G Ratio 1.3 10/13/2020 09:09 AM    ALT (SGPT) 26 10/13/2020 09:09 AM       Lab Results   Component Value Date/Time    Hemoglobin A1c 5.8 (H) 09/21/2020 10:07 AM       Lab Results   Component Value Date/Time    TSH 0.97 09/21/2020 10:07 AM    T4, Free 0.9 09/21/2020 10:07 AM       Lab Results   Component Value Date/Time    Vitamin B12 786 09/21/2020 10:07 AM    Folate >20.0 (H) 09/21/2020 10:07 AM       Lab Results   Component Value Date/Time    Iron 48 (L) 09/21/2020 10:07 AM    TIBC 383 09/21/2020 10:07 AM    Iron % saturation 13 (L) 09/21/2020 10:07 AM    Ferritin 42 09/21/2020 10:07 AM         Cortisol, a.m. Date Value Ref Range Status   10/01/2020 16.0 4.30 - 22.45 ug/dL Final       C-Reactive protein   Date Value Ref Range Status   10/01/2020 <0.3 0 - 0.3 mg/dL Final       Sed rate, automated   Date Value Ref Range Status   10/01/2020 1 0 - 30 mm/hr Final           Review of Records:     Neurosurgery (11/5/2020):    Ophelia Pedroza returns to the office for a follow up of worsening bilateral upper and lower extremity numbness and tingling. After reviewing the imaging studies I was not convinced that it was coming from the spine she was sent for an EMG nerve conduction study to rule out peripheral neuropathy. .     Physical Examination: No change, .     Studies: EMG nerve conduction study is consistent with chronic multicervical nerve root irritation, bilateral carpal tunnel syndrome and acute on chronic L5-S1 radiculopathy and multi other level chronic neuropathies. Assessment:   Encounter Diagnoses   Code Name Primary?  K45.022 Cervical spondylosis without myelopathy Yes    M47.816 Lumbar spondylosis    G56.03 Bilateral carpal tunnel syndrome     Plan: I do not recommend another large surgery in her case. I like to send her to pain management for cervical epidural steroid injections. The patient is going to start wearing carpal tunnel splints and she is also going to consider carpal tunnel injections. Finally I like her to consider lumbar epidural steroid injection if possible given her extensive past surgical history. Patient will follow up with me after some conservative therapy. If she gets no relief I will discuss possible spinal cord stimulator.          Cardiology (10/15/2020):    Chief Complaint: preoperative clearance, PAF    Assessment & Plan:    1. Pre-operative clearance  - EKG 12 LEAD UNIT PERFORMED  Patient felt to be at moderate risk for non cardiac surgery, this risk can not be further minimized   OK to hold eliquis 48-72 hours prior to surgery which is scheduled for 10/20/20 - she is going to stop eliquis Saturday  Continue metoprolol and cardizem during the perioperative time frame     2. PAF (paroxysmal atrial fibrillation)   - EKG 12 LEAD UNIT PERFORMED  SR by EKG today   Continue eliquis for CVA risk reduction - stop temporarily as above  Continue cardizem and metoprolol     3. Coronary artery disease of native artery of native heart with stable angina pectoris ([address]  Stable  Last NST in 2018 was negative for ischemia  No symptoms to suggest angina   Will place her on temporary ASA 81 mg since holding eliquis (hx of PCI)  Once back on eliquis she can then stop the baby ASA   Continue metoprolol, pravastatin    4.  Essential hypertension  Borderline elevated today  Monitor  No changes made     5. Dyslipidemia  Continue statin therapy     Follow up will be scheduled in: 6 months   Healthy lifestyle has been encouraged including avoidance of tobacco, heart healthy diet, regular exercise and maintaining an optimal BMI. The patient has been encouraged to seek emergency care in the interim should signs or symptoms dictate. The patient has also been encouraged to contact the office for any concerns or questions    Problem List:    1. Atrial fibrillation  1. Recurrent post op with RVR 9/7/19 - converted to SR with Cardizem gtt.  Symptomatic with palpitations/CP  2. [facility] on hold for recent surgery  3. Noted on 11/30/16 (strip scanned in media)  4. Post operatively after lumbar surgery 11/21/16    5. CHADS-VASC 6 (HTN, F, Age, DVT, CAD)  6. ZIO patch 1/12/17 - SVT, atrial tach and PAF (asymptomatic)  7. Recurrence during colonoscopy 7/11/19 -- All notes mention regular rate and rhythm with normal vital signs during procedure except one note that reports  but no other documentation available for review  2. L/S hardware infection   1. S/p removal 9/4/19  2. PICC 1031/19: Receiving IV antibiotic's. 3. CAD  1. UA s/p [facility] 2/2005 - PCI to LAD using a SARIAH  2. Staged PCI 3/2005 - staged PCI of LCx using a SARIAH  3. [facility] 1/30/08 - new 90% distal OM1 s/p PCI using a Taxus 2.5 x 16 mm SARIAH. Residual disease with diffuse disease of the mid RCA with a discrete 50% stenosis as well as very diffuse distal LAD disease at the apex. The previously implanted stents were widely patent with no restenosis.    4. NST normal 6/13/08, 3/14/2011, 3/11/14 normal perfusion, normal LV EF on gated imaging. 5. NST 3/5/18 - EF 66%, no reversible ischemia or infarct  4. Normal left ventricular function  1. Echo 12/1/16 - EF 39%, mild diastolic dysfunction, dilated RV, PAP 17mmHg  2. Echo 9/9/19 - EF 70%, no sig valvular pathology  5.  S/p renal transplant in 7/1992 with essentially normal renal function. 6. Hypertension  7. Dyslipidemia  8. Sleep apnea  9. Sjögrens syndrome. 10. Asthma. 11. Fibromyalgia. 12. Chronic constipation and IBS  13. Anxiety. 14. Polypharmacy.    15. S/p L shoulder surgery 11/7/06 and 2007  16. Cervical spine C4-C5, C5-C6 surgery in 2007.    1. Removal of L4-5 Coflex, L3-4, Re-do L4-5, L5-S1 Decompression, L3-4, L4-5 & L5-S1 PLIF, L3-4, L4-5, L5-S1 PL arthrodesis and pedicle screw fixation 11/21/16.    17. S/p L TKA    18. Lower extremity DVT 3/2011, tx with 3 months of Coumadin  19. H/o tobacco abuse -- quit 2000    HPI:  Rox Robles is a 77y.o. year old followed by Dr Tressa Maher who presents today in follow up for preoperative clearance. She was last seen in the office 3 months ago at which time she was having a constellation of symptoms including palpiations. Her EKG at that time was reassuring. She ended up going to the ED the following day. Essentially benign work up there and was released same day. She has since seen her neurosurgeon given hx of cervical spine disease. She was referred for EMG study given ongoing peripheral neuropathy. She is now in need of left foot bunionectomy. She denies any symptoms including CP SOB. She has not had any recurrent palpitations. She has not been able to exercise like she was due to left foot issues. But denies any exertional symptoms when she does any exertional activity. EKG today SR with first degree AVB. ID (10/12/2020):   trial of fluconazole 200mg daily x7d; if no response, may be resistant; if response, consider weekly dosing; continue suppressive doxycyline, CBC, ESR, CRP prior to scheduled visit next month    GI (9/28/2020): Tapering PPI, currently Nexium 20 mg daily, may try to taper off and use Pepcid AC as needed    Thyroid ultrasound (Nov 2011):     1. Multiple bilateral thyroid cysts. 2. Two more complex heterogeneous structures measuring less than 1 cm.  Follow-up ultrasound can be obtained to ensure stability. 3. Overall heterogeneous appearance of the gland. We discussed the expected course, resolution and complications of the diagnosis(es) in detail. Medication risks, benefits, costs, interactions, and alternatives were discussed as indicated. I advised her to contact the office if her condition worsens, changes or fails to improve as anticipated. She expressed understanding with the diagnosis(es) and plan. Naresh Youssef is a 77 y.o. female who was evaluated by an audio only encounter for concerns as above. Patient identification was verified prior to start of the visit. A caregiver was present when appropriate. Due to this being a TeleHealth encounter (During [facility]-18 public health emergency), evaluation of the following organ systems was limited: Vitals/Constitutional/EENT/Resp/CV/GI//MS/Neuro/Skin/Heme-Lymph-Imm. Pursuant to the emergency declaration under the [address] [place] [address], [facility] waiver authority and the [employer] Act, this Virtual Visit was conducted, with patient's (and/or legal guardian's) consent, to reduce the patient's risk of exposure to COVID-19 and provide necessary medical care. Services were provided through a synchronous discussion virtually to substitute for in-person clinic visit. I was in the office. The patient was at home.       Wing Bazan MD  Internal Medicine, Family Medicine & Sports Medicine

## 2020-11-17 ENCOUNTER — TELEPHONE (OUTPATIENT)
Dept: FAMILY MEDICINE CLINIC | Age: 75
End: 2020-11-17

## 2020-11-17 NOTE — TELEPHONE ENCOUNTER
Klaudia from Dr Castro Patiño office w/ Endocrinology called asking for the tyroid us results from 2011 be faxed to them.        Fax [phone]

## 2020-11-17 NOTE — TELEPHONE ENCOUNTER
Called Klaudia and informed her the report is in [facility] it was done on 11/25/11. She states she does not see it so will fax over.

## 2020-12-07 PROBLEM — E04.1 THYROID NODULE: Status: ACTIVE | Noted: 2020-12-07

## 2020-12-17 ENCOUNTER — OFFICE VISIT (OUTPATIENT)
Dept: FAMILY MEDICINE CLINIC | Age: 75
End: 2020-12-17
Payer: MEDICARE

## 2020-12-17 ENCOUNTER — APPOINTMENT (OUTPATIENT)
Dept: FAMILY MEDICINE CLINIC | Age: 75
End: 2020-12-17

## 2020-12-17 ENCOUNTER — HOSPITAL ENCOUNTER (OUTPATIENT)
Dept: LAB | Age: 75
Discharge: HOME | End: 2020-12-17
Payer: MEDICARE

## 2020-12-17 VITALS
OXYGEN SATURATION: 100 % | DIASTOLIC BLOOD PRESSURE: 64 MMHG | RESPIRATION RATE: 16 BRPM | TEMPERATURE: 97.9 F | HEART RATE: 60 BPM | BODY MASS INDEX: 30.77 KG/M2 | WEIGHT: 180.2 LBS | SYSTOLIC BLOOD PRESSURE: 137 MMHG | HEIGHT: 64 IN

## 2020-12-17 DIAGNOSIS — Z01.818 PREOP GENERAL PHYSICAL EXAM: ICD-10-CM

## 2020-12-17 DIAGNOSIS — M20.5X2 ACQUIRED HALLUX LIMITUS OF LEFT FOOT: ICD-10-CM

## 2020-12-17 DIAGNOSIS — Z01.818 PREOP GENERAL PHYSICAL EXAM: Primary | ICD-10-CM

## 2020-12-17 LAB
ALBUMIN SERPL-MCNC: 3.8 G/DL (ref 3.4–5)
ALBUMIN/GLOB SERPL: 1.2 {RATIO} (ref 0.8–1.7)
ALP SERPL-CCNC: 93 U/L (ref 45–117)
ALT SERPL-CCNC: 21 U/L (ref 13–56)
ANION GAP SERPL CALC-SCNC: 10 MMOL/L (ref 3–18)
AST SERPL-CCNC: 10 U/L (ref 10–38)
BASOPHILS # BLD: 0 K/UL (ref 0–0.1)
BASOPHILS NFR BLD: 0 % (ref 0–2)
BILIRUB SERPL-MCNC: 0.2 MG/DL (ref 0.2–1)
BUN SERPL-MCNC: 23 MG/DL (ref 7–18)
BUN/CREAT SERPL: 26 (ref 12–20)
CALCIUM SERPL-MCNC: 9.3 MG/DL (ref 8.5–10.1)
CHLORIDE SERPL-SCNC: 113 MMOL/L (ref 100–111)
CO2 SERPL-SCNC: 22 MMOL/L (ref 21–32)
CREAT SERPL-MCNC: 0.87 MG/DL (ref 0.6–1.3)
DIFFERENTIAL METHOD BLD: ABNORMAL
EOSINOPHIL # BLD: 0.1 K/UL (ref 0–0.4)
EOSINOPHIL NFR BLD: 1 % (ref 0–5)
ERYTHROCYTE [DISTWIDTH] IN BLOOD BY AUTOMATED COUNT: 17.4 % (ref 11.6–14.5)
GLOBULIN SER CALC-MCNC: 3.2 G/DL (ref 2–4)
GLUCOSE SERPL-MCNC: 76 MG/DL (ref 74–99)
HCT VFR BLD AUTO: 39.2 % (ref 35–45)
HGB BLD-MCNC: 12.6 G/DL (ref 12–16)
LYMPHOCYTES # BLD: 1.9 K/UL (ref 0.9–3.6)
LYMPHOCYTES NFR BLD: 26 % (ref 21–52)
MCH RBC QN AUTO: 24 PG (ref 24–34)
MCHC RBC AUTO-ENTMCNC: 32.1 G/DL (ref 31–37)
MCV RBC AUTO: 74.5 FL (ref 74–97)
MONOCYTES # BLD: 0.4 K/UL (ref 0.05–1.2)
MONOCYTES NFR BLD: 5 % (ref 3–10)
NEUTS SEG # BLD: 4.8 K/UL (ref 1.8–8)
NEUTS SEG NFR BLD: 68 % (ref 40–73)
PLATELET # BLD AUTO: 267 K/UL (ref 135–420)
PMV BLD AUTO: 10.1 FL (ref 9.2–11.8)
POTASSIUM SERPL-SCNC: 4.3 MMOL/L (ref 3.5–5.5)
PROT SERPL-MCNC: 7 G/DL (ref 6.4–8.2)
RBC # BLD AUTO: 5.26 M/UL (ref 4.2–5.3)
SODIUM SERPL-SCNC: 145 MMOL/L (ref 136–145)
WBC # BLD AUTO: 7.1 K/UL (ref 4.6–13.2)

## 2020-12-17 PROCEDURE — 85025 COMPLETE CBC W/AUTO DIFF WBC: CPT

## 2020-12-17 PROCEDURE — 99214 OFFICE O/P EST MOD 30 MIN: CPT | Performed by: NURSE PRACTITIONER

## 2020-12-17 PROCEDURE — G9711 PT HX TOT COL OR COLON CA: HCPCS | Performed by: NURSE PRACTITIONER

## 2020-12-17 PROCEDURE — 80053 COMPREHEN METABOLIC PANEL: CPT

## 2020-12-17 PROCEDURE — G8536 NO DOC ELDER MAL SCRN: HCPCS | Performed by: NURSE PRACTITIONER

## 2020-12-17 PROCEDURE — 36415 COLL VENOUS BLD VENIPUNCTURE: CPT

## 2020-12-17 PROCEDURE — 1101F PT FALLS ASSESS-DOCD LE1/YR: CPT | Performed by: NURSE PRACTITIONER

## 2020-12-17 PROCEDURE — G9231 DOC ESRD DIA TRANS PREG: HCPCS | Performed by: NURSE PRACTITIONER

## 2020-12-17 PROCEDURE — 1090F PRES/ABSN URINE INCON ASSESS: CPT | Performed by: NURSE PRACTITIONER

## 2020-12-17 PROCEDURE — G8427 DOCREV CUR MEDS BY ELIG CLIN: HCPCS | Performed by: NURSE PRACTITIONER

## 2020-12-17 PROCEDURE — G8417 CALC BMI ABV UP PARAM F/U: HCPCS | Performed by: NURSE PRACTITIONER

## 2020-12-17 PROCEDURE — G8432 DEP SCR NOT DOC, RNG: HCPCS | Performed by: NURSE PRACTITIONER

## 2020-12-17 PROCEDURE — G0463 HOSPITAL OUTPT CLINIC VISIT: HCPCS | Performed by: NURSE PRACTITIONER

## 2020-12-17 NOTE — PROGRESS NOTES
HPI  77 y.o. female is presenting for preoperative evaluation for left foot surgery. Past Medical History:   Diagnosis Date    Arthropathy     Atrial fibrillation with RVR ([facility] 2016    Atrophic vaginitis     Bladder tumor     Coronary artery disease     Crohn disease ([facility]     DJD (degenerative joint disease)     Esophageal reflux     Female cystocele     Fibrocystic breast     Fibromyalgia     GERD (gastroesophageal reflux disease)     History of kidney transplant     IBS (irritable bowel syndrome)     Lower urinary tract symptoms (LUTS)     Nephrogenic adenoma of bladder     Nocturia     Osteopenia     Pelvic organ prolapse quantification stage 1 cystocele     Renal failure     Shingles     Sjogren's syndrome ([facility]     Sleep apnea        Past Surgical History:   Procedure Laterality Date    HX BACK SURGERY  2014    laminectomy T10/11    HX BACK SURGERY  2019    hardware removed    HX BACK SURGERY  2014    laminectomy RIGHT L5/S1    HX BILATERAL MASTECTOMY Bilateral 2018    HX BREAST LUMPECTOMY  1998    HX CERVICAL FUSION  2007    HX COLECTOMY  1984    iliocolectomy    HX CORONARY STENT PLACEMENT  ;    HX HEART CATHETERIZATION  2008    HX HEMORRHOIDECTOMY  1993    HX HIP REPLACEMENT Right 2020    HX KNEE REPLACEMENT Left     HX KNEE REPLACEMENT  3/2015    left    HX LIPECTOMY  2001    HX MOHS PROCEDURES  1996    HX PARATHYROIDECTOMY      HX RENAL TRANSPLANT      HX TUBAL LIGATION         Family History   Problem Relation Age of Onset    Ovarian Cancer Mother     Diabetes Sister     Hypertension Sister     Diabetes Brother     Hypertension Brother     Hypertension Brother     Diabetes Sister        Social History     Tobacco Use    Smoking status: Former Smoker     Start date:      Quit date: 2000     Years since quittin.8    Smokeless tobacco: Never Used   Substance Use Topics    Alcohol use:  No Alcohol/week: 0.0 standard drinks    Drug use: No       Current Outpatient Medications on File Prior to Visit   Medication Sig Dispense Refill    Anucort-HC 25 mg supp       apixaban (Eliquis) 5 mg tablet Take 5 mg by mouth two (2) times a day.  doxycycline (VIBRAMYCIN) 100 mg capsule take 1 capsule by mouth twice a day      desonide (TRIDESILON) 0.05 % cream APPLY TO AFFECTED AREA OF FACE TWICE A DAY AS NEEDED      ketoconazole (NIZORAL) 2 % topical cream apply to affected area ON THE FACE twice a day      pravastatin (PRAVACHOL) 20 mg tablet take 1 tablet by mouth once daily at bedtime (Patient taking differently: Take 20 mg by mouth every Monday, Wednesday, Friday.) 90 Tab 1    metoprolol tartrate (LOPRESSOR) 50 mg tablet Take 1 Tab by mouth two (2) times a day. 180 Tab 1    acetaminophen/diphenhydramine (TYLENOL PM EXTRA STRENGTH PO) Take 1 Tab by mouth nightly as needed.  carboxymethylcellulose sodium (REFRESH OP) Apply  to eye.  cholecalciferol (VITAMIN D3) (1000 Units /25 mcg) tablet Take 7,000 Units by mouth every three (3) days.  dilTIAZem SR (CARDIZEM SR) 60 mg SR capsule Take 1 Cap by mouth two (2) times a day.  krill-om-3-dha-epa-phospho-ast (KRILL OIL) 1,326-620-22-80 mg cap Take 1,000 mg by mouth daily.  methylcellulose (CITRUCEL) 500 mg tablet Take 500 mg by mouth daily.  furosemide (LASIX) 40 mg tablet Take 40 mg by mouth daily as needed.  losartan (COZAAR) 100 mg tablet Take 100 mg by mouth nightly.  multivit-min/FA/lutein/zeaxant (MACULAR VITAMIN PO) Take 1 Tab by mouth daily.  Lactobacillus acidophilus (PROBIOTIC PO) Take 1 Cap by mouth daily.  ketotifen (ZADITOR) 0.025 % (0.035 %) ophthalmic solution Administer 1 Drop to both eyes two (2) times daily as needed.  acyclovir (ZOVIRAX) 5 % ointment Apply 1.5 Each to affected area five (5) times daily.  As needed      magnesium oxide (MAG-OX) 400 mg tablet Take 800 mg by mouth two (2) times a day.  potassium chloride (K-DUR, KLOR-CON) 20 mEq tablet Take 20 mEq by mouth daily.  predniSONE (DELTASONE) 5 mg tablet Take 5 mg by mouth daily.  mycophenolate sodium (MYFORTIC) 360 mg delayed release tablet Take 720 mg by mouth two (2) times a day. No current facility-administered medications on file prior to visit. Allergies   Allergen Reactions    Atenolol Myalgia    Atorvastatin Myalgia     Other reaction(s): Unknown    Azathioprine Other (comments)     Other reaction(s): Unknown    Iodinated Contrast Media Other (comments)     Had kidney transplant      Ketorolac Tromethamine Other (comments) and Itching     Other reaction(s): Unknown         Review of Systems   Constitutional: Negative for chills, fever, malaise/fatigue and weight loss. HENT: Negative for congestion, ear pain, hearing loss, sinus pain, sore throat and tinnitus. Eyes: Negative for blurred vision, double vision, photophobia and pain. Respiratory: Negative for cough and shortness of breath. Cardiovascular: Negative for chest pain, palpitations and leg swelling. Gastrointestinal: Negative for abdominal pain, constipation, diarrhea, heartburn, nausea and vomiting. Genitourinary: Negative for dysuria, frequency and urgency. Musculoskeletal: Negative for back pain, joint pain and myalgias. Skin: Negative for rash. Neurological: Negative for dizziness and headaches. Psychiatric/Behavioral: Negative for depression and suicidal ideas. The patient is not nervous/anxious. PE  /64 (BP 1 Location: Right arm, BP Patient Position: Sitting)   Pulse 60   Temp 97.9 °F (36.6 °C) (Temporal)   Resp 16   Ht 5' 4\" (1.626 m)   Wt 180 lb 3.2 oz (81.7 kg)   SpO2 100%   BMI 30.93 kg/m²     Physical Exam  Vitals signs reviewed. Constitutional:       General: She is not in acute distress. Appearance: Normal appearance. HENT:      Head: Normocephalic and atraumatic.       Right Ear: Tympanic membrane, ear canal and external ear normal.      Left Ear: Tympanic membrane, ear canal and external ear normal.      Nose: Nose normal. No nasal deformity, congestion or rhinorrhea. Mouth/Throat:      Lips: Pink. No lesions. Mouth: Mucous membranes are moist. No oral lesions. Dentition: Normal dentition. Tongue: No lesions. Pharynx: Oropharynx is clear. Eyes:      General: Lids are normal.      Extraocular Movements: Extraocular movements intact. Conjunctiva/sclera: Conjunctivae normal.      Pupils: Pupils are equal, round, and reactive to light. Neck:      Musculoskeletal: Full passive range of motion without pain. Thyroid: No thyroid mass, thyromegaly or thyroid tenderness. Cardiovascular:      Rate and Rhythm: Normal rate and regular rhythm. Pulses:           Dorsalis pedis pulses are 2+ on the right side and 2+ on the left side. Heart sounds: S1 normal and S2 normal. No murmur. No friction rub. No gallop. No S3 or S4 sounds. Abdominal:      General: Abdomen is flat. Bowel sounds are normal. There is no distension. Palpations: Abdomen is soft. There is no hepatomegaly, splenomegaly or mass. Tenderness: There is no abdominal tenderness. There is no guarding or rebound. Musculoskeletal:      Right lower leg: No edema. Left lower leg: No edema. Lymphadenopathy:      Head:      Right side of head: No submental, submandibular, tonsillar, preauricular, posterior auricular or occipital adenopathy. Left side of head: No submental, submandibular, tonsillar, preauricular, posterior auricular or occipital adenopathy. Cervical: No cervical adenopathy. Upper Body:      Right upper body: No supraclavicular adenopathy. Left upper body: No supraclavicular adenopathy. Skin:     General: Skin is warm and dry. Capillary Refill: Capillary refill takes less than 2 seconds.    Neurological:      General: No focal deficit present. Mental Status: She is alert and oriented to person, place, and time. Cranial Nerves: Cranial nerves are intact. Sensory: Sensation is intact. Motor: Motor function is intact. Coordination: Coordination is intact. Gait: Gait is intact. Deep Tendon Reflexes:      Reflex Scores:       Patellar reflexes are 2+ on the right side and 2+ on the left side. Psychiatric:         Attention and Perception: Attention and perception normal.         Mood and Affect: Mood and affect normal.         Speech: Speech normal.         Behavior: Behavior normal.         Thought Content: Thought content normal.         Cognition and Memory: Cognition and memory normal.         Judgment: Judgment normal.         Assessment/Plan  1.  Preoperative evaluation for left foot surgery  -CBC, CMP  -EKG done August 2020, see attached  -Eliquis and cardiac medications as per cardiology recommendations  -Hold all medications AM of procedure except for prednisone, mycophenalate, and BP medications as recommended by cardiology

## 2020-12-17 NOTE — PROGRESS NOTES
Ngoc Gasca is a 77 y.o. female (: 1945) presenting to address:    Chief Complaint   Patient presents with    Pre-op Exam     left foot       Vitals:    20 0930   BP: 137/64   Pulse: 60   Resp: 16   Temp: 97.9 °F (36.6 °C)   TempSrc: Temporal   SpO2: 100%   Weight: 180 lb 3.2 oz (81.7 kg)   Height: 5' 4\" (1.626 m)   PainSc:   3   PainLoc: Foot       Is someone accompanying this pt? NO    Is the patient using any DME equipment during OV? NO    Hearing/Vision:   No exam data present    Learning Assessment:     Learning Assessment 10/23/2019   PRIMARY LEARNER Patient   HIGHEST LEVEL OF EDUCATION - PRIMARY LEARNER  2 YEARS OF COLLEGE   BARRIERS PRIMARY LEARNER NONE   CO-LEARNER CAREGIVER No   PRIMARY LANGUAGE ENGLISH   LEARNER PREFERENCE PRIMARY LISTENING   ANSWERED BY stephenie   RELATIONSHIP SELF     Depression Screening:     3 most recent PHQ Screens 2020   Little interest or pleasure in doing things Not at all   Feeling down, depressed, irritable, or hopeless Not at all   Total Score PHQ 2 0     Fall Risk Assessment:     Fall Risk Assessment, last 12 mths 2020   Able to walk? Yes   Fall in past 12 months? No     Coordination of Care Questionaire:   1. Have you been to the ER, urgent care clinic since your last visit? Hospitalized since your last visit? YES dr Ariela Salamanca foot doc     2. Have you seen or consulted any other health care providers outside of the [address] [place] [address] since your last visit? Include any pap smears or colon screening. NO    Advanced Directive:   1. Do you have an Advanced Directive? NO    2. Would you like information on Advanced Directives?  NO

## 2021-01-26 DIAGNOSIS — E04.1 THYROID NODULE: ICD-10-CM

## 2021-02-01 ENCOUNTER — TELEPHONE (OUTPATIENT)
Dept: FAMILY MEDICINE CLINIC | Age: 76
End: 2021-02-01

## 2021-02-01 NOTE — TELEPHONE ENCOUNTER
Pt would like to know if Dr Odalys Yi thinks it's safe for her to get the covid vaccine. Please advise.

## 2021-02-04 PROBLEM — D10.39 PAPILLOMA OF PALATE: Status: ACTIVE | Noted: 2021-02-04

## 2021-02-09 ENCOUNTER — TELEPHONE (OUTPATIENT)
Dept: FAMILY MEDICINE CLINIC | Age: 76
End: 2021-02-09

## 2021-02-09 DIAGNOSIS — Z23 ENCOUNTER FOR IMMUNIZATION: Primary | ICD-10-CM

## 2021-02-09 NOTE — TELEPHONE ENCOUNTER
Please place orders for COVID Vaccine # 1 & # 2    Future Appointments   Date Time Provider Liz Berumen   2/10/2021 11:10 AM [facility] COVID VACCINE [facility] AM[facility]   3/10/2021 11:10 AM [facility] COVID VACCINE 28 DAY [facility] AMB

## 2021-02-10 ENCOUNTER — IMMUNIZATION (OUTPATIENT)
Dept: INTERNAL MEDICINE CLINIC | Age: 76
End: 2021-02-10
Payer: MEDICARE

## 2021-02-10 ENCOUNTER — DOCUMENTATION ONLY (OUTPATIENT)
Dept: INTERNAL MEDICINE CLINIC | Age: 76
End: 2021-02-10

## 2021-02-10 DIAGNOSIS — Z23 ENCOUNTER FOR IMMUNIZATION: Primary | ICD-10-CM

## 2021-02-10 DIAGNOSIS — Z23 ENCOUNTER FOR ADMINISTRATION OF VACCINE: ICD-10-CM

## 2021-02-10 PROCEDURE — 0011A COVID-19, MRNA, LNP-S, PF, 100MCG/0.5ML DOSE(MODERNA): CPT | Performed by: FAMILY MEDICINE

## 2021-02-10 PROCEDURE — 91301 COVID-19, MRNA, LNP-S, PF, 100MCG/0.5ML DOSE(MODERNA): CPT | Performed by: FAMILY MEDICINE

## 2021-02-10 NOTE — PROGRESS NOTES
Bethany Cornejo is a 77 y.o. female who presents for routine immunizations. She denies any symptoms , reactions or allergies that would exclude them from being immunized today. Risks and adverse reactions were discussed and the VIS was given to them. All questions were addressed. She was observed for 15 min post injection. There were no reactions observed. Suha Ayala, SHERINN

## 2021-03-10 ENCOUNTER — DOCUMENTATION ONLY (OUTPATIENT)
Dept: FAMILY MEDICINE CLINIC | Age: 76
End: 2021-03-10

## 2021-03-10 ENCOUNTER — IMMUNIZATION (OUTPATIENT)
Dept: INTERNAL MEDICINE CLINIC | Age: 76
End: 2021-03-10
Payer: MEDICARE

## 2021-03-10 DIAGNOSIS — Z23 ENCOUNTER FOR IMMUNIZATION: ICD-10-CM

## 2021-03-10 PROCEDURE — 0012A COVID-19, MRNA, LNP-S, PF, 100MCG/0.5ML DOSE(MODERNA): CPT | Performed by: FAMILY MEDICINE

## 2021-03-10 PROCEDURE — 91301 COVID-19, MRNA, LNP-S, PF, 100MCG/0.5ML DOSE(MODERNA): CPT | Performed by: FAMILY MEDICINE

## 2021-03-10 NOTE — PROGRESS NOTES
Isiah Beach is a 77 y.o. female who presents for   Chief Complaint   Patient presents with    Immunization/Injection     Covid 23- 2nd immunization: Pricilla Rojas   ,  She denies any symptoms , reactions or allergies that would exclude them from being immunized today. Risks and adverse reactions were discussed and the VIS was given to them. All questions were addressed. She was observed for 15 min post injection. There were no reactions observed.     Anu Gardner LPN

## 2021-03-26 ENCOUNTER — TELEPHONE (OUTPATIENT)
Dept: FAMILY MEDICINE CLINIC | Age: 76
End: 2021-03-26

## 2021-03-29 NOTE — TELEPHONE ENCOUNTER
Spoke to patient she states she was in the ER and needs a follow up to be seen. Informed patient that she is currently scheduled to be seen already on 4/8/21 and that is the first available currently. Patient agreeable.

## 2021-04-08 ENCOUNTER — OFFICE VISIT (OUTPATIENT)
Dept: FAMILY MEDICINE CLINIC | Age: 76
End: 2021-04-08
Payer: MEDICARE

## 2021-04-08 VITALS
RESPIRATION RATE: 18 BRPM | OXYGEN SATURATION: 100 % | DIASTOLIC BLOOD PRESSURE: 82 MMHG | TEMPERATURE: 97.5 F | SYSTOLIC BLOOD PRESSURE: 161 MMHG | BODY MASS INDEX: 31.62 KG/M2 | WEIGHT: 185.2 LBS | HEART RATE: 60 BPM | HEIGHT: 64 IN

## 2021-04-08 DIAGNOSIS — I48.0 PAF (PAROXYSMAL ATRIAL FIBRILLATION) (HCC): ICD-10-CM

## 2021-04-08 DIAGNOSIS — D84.9 IMMUNOSUPPRESSED STATUS (HCC): ICD-10-CM

## 2021-04-08 DIAGNOSIS — R05.8 ALLERGIC COUGH: ICD-10-CM

## 2021-04-08 DIAGNOSIS — F51.04 PSYCHOPHYSIOLOGICAL INSOMNIA: ICD-10-CM

## 2021-04-08 DIAGNOSIS — M35.00 SJOGREN'S SYNDROME, WITH UNSPECIFIED ORGAN INVOLVEMENT (HCC): ICD-10-CM

## 2021-04-08 DIAGNOSIS — C50.911 MALIGNANT NEOPLASM OF RIGHT FEMALE BREAST, UNSPECIFIED ESTROGEN RECEPTOR STATUS, UNSPECIFIED SITE OF BREAST (HCC): ICD-10-CM

## 2021-04-08 DIAGNOSIS — K50.10 CROHN'S DISEASE OF LARGE INTESTINE WITHOUT COMPLICATION (HCC): ICD-10-CM

## 2021-04-08 DIAGNOSIS — Z00.00 MEDICARE ANNUAL WELLNESS VISIT, SUBSEQUENT: Primary | ICD-10-CM

## 2021-04-08 DIAGNOSIS — E89.2 S/P PARATHYROIDECTOMY (HCC): ICD-10-CM

## 2021-04-08 PROCEDURE — G9711 PT HX TOT COL OR COLON CA: HCPCS | Performed by: FAMILY MEDICINE

## 2021-04-08 PROCEDURE — 99214 OFFICE O/P EST MOD 30 MIN: CPT | Performed by: FAMILY MEDICINE

## 2021-04-08 PROCEDURE — G8427 DOCREV CUR MEDS BY ELIG CLIN: HCPCS | Performed by: FAMILY MEDICINE

## 2021-04-08 PROCEDURE — G8510 SCR DEP NEG, NO PLAN REQD: HCPCS | Performed by: FAMILY MEDICINE

## 2021-04-08 PROCEDURE — 1090F PRES/ABSN URINE INCON ASSESS: CPT | Performed by: FAMILY MEDICINE

## 2021-04-08 PROCEDURE — G8417 CALC BMI ABV UP PARAM F/U: HCPCS | Performed by: FAMILY MEDICINE

## 2021-04-08 PROCEDURE — G0439 PPPS, SUBSEQ VISIT: HCPCS | Performed by: FAMILY MEDICINE

## 2021-04-08 PROCEDURE — 1101F PT FALLS ASSESS-DOCD LE1/YR: CPT | Performed by: FAMILY MEDICINE

## 2021-04-08 PROCEDURE — G8536 NO DOC ELDER MAL SCRN: HCPCS | Performed by: FAMILY MEDICINE

## 2021-04-08 PROCEDURE — G9231 DOC ESRD DIA TRANS PREG: HCPCS | Performed by: FAMILY MEDICINE

## 2021-04-08 RX ORDER — BUSPIRONE HYDROCHLORIDE 5 MG/1
5 TABLET ORAL
Qty: 60 TAB | Refills: 5 | Status: SHIPPED | OUTPATIENT
Start: 2021-04-08

## 2021-04-08 RX ORDER — MONTELUKAST SODIUM 10 MG/1
10 TABLET ORAL DAILY
COMMUNITY

## 2021-04-08 RX ORDER — TRAZODONE HYDROCHLORIDE 50 MG/1
50 TABLET ORAL
Qty: 90 TAB | Refills: 3 | Status: SHIPPED | OUTPATIENT
Start: 2021-04-08

## 2021-04-08 NOTE — PATIENT INSTRUCTIONS
- please talk to Dr. Maricruz Najera (kidney doctor) about your iron deficiency, because it may be related to your kidney disease 
 
- Dr. Frank Mohan recommended 'watchful waiting' for how your left foot is doing at least 8-10 months after surgery 
 
- for the excessive sweating: need to do that 24 hour urine - time to check with Dr. Steinberg Reasoner re: your osteoporosis and Prolia 
 
- consider using the trazodone more often if you need to help get you a good night's sleep 
 
- use buspirone 5mg as needed for anxiety / irritability; max dose is 10mg in a day (2 pills per day) for you right now. If it is working, but you think you might need a higher dose, please call. Also some people use this medication on a scheduled basis, so if it works, please keep that in mind. - The allergy office should be calling you to schedule in the next 1-2 weeks. If you don't hear from them after 2 weeks, call their office directly to check on the status of your referral. 
 
 
 
 
 
 
Medicare Wellness Visit, Female The best way to live healthy is to have a lifestyle where you eat a well-balanced diet, exercise regularly, limit alcohol use, and quit all forms of tobacco/nicotine, if applicable. Regular preventive services are another way to keep healthy. Preventive services (vaccines, screening tests, monitoring & exams) can help personalize your care plan, which helps you manage your own care. Screening tests can find health problems at the earliest stages, when they are easiest to treat. [facility] follows the current, evidence-based guidelines published by the [place] (USPSTF) when recommending preventive services for our patients. Because we follow these guidelines, sometimes recommendations change over time as research supports it. (For example, mammograms used to be recommended annually.  Even though Medicare will still pay for an annual mammogram, the newer guidelines recommend a mammogram every two years for women of average risk). Of course, you and your doctor may decide to screen more often for some diseases, based on your risk and your co-morbidities (chronic disease you are already diagnosed with). Preventive services for you include: - Medicare offers their members a free annual wellness visit, which is time for you and your primary care provider to discuss and plan for your preventive service needs. Take advantage of this benefit every year! 
-All adults over the age of 72 should receive the recommended pneumonia vaccines. Current USPSTF guidelines recommend a series of two vaccines for the best pneumonia protection.  
-All adults should have a flu vaccine yearly and a tetanus vaccine every 10 years.  
-All adults age 48 and older should receive the shingles vaccines (series of two vaccines). -All adults age 38-68 who are overweight should have a diabetes screening test once every three years.  
-All adults born between [birth year] and 1964 should be screened once for Hepatitis C. 
-Other screening tests and preventive services for persons with diabetes include: an eye exam to screen for diabetic retinopathy, a kidney function test, a foot exam, and stricter control over your cholesterol.  
-Cardiovascular screening for adults with routine risk involves an electrocardiogram (ECG) at intervals determined by your doctor.  
-Colorectal cancer screenings should be done for adults age 54-65 with no increased risk factors for colorectal cancer. There are a number of acceptable methods of screening for this type of cancer. Each test has its own benefits and drawbacks. Discuss with your doctor what is most appropriate for you during your annual wellness visit.  The different tests include: colonoscopy (considered the best screening method), a fecal occult blood test, a fecal DNA test, and sigmoidoscopy. 
 
-A bone mass density test is recommended when a woman turns 65 to screen for osteoporosis. This test is only recommended one time, as a screening. Some providers will use this same test as a disease monitoring tool if you already have osteoporosis. -Breast cancer screenings are recommended every other year for women of normal risk, age 54-69. 
-Cervical cancer screenings for women over age 72 are only recommended with certain risk factors. Dr. Amada Talbert no longer practicing Primary Care effective July 1st, 2021 Due to increasing family obligations, I will no longer be practicing as a Primary Care physician effective July 1, 2021. Leading up to July 1st, I will work collaboratively with my current primary care colleagues at [facility]) to facilitate this transition for my primary care patients. If you have not been contacted about any appointments scheduled for after July 1, 2021, please assist [facility] in optimizing the continuity of your care by contacting the practice for appropriate and timely rescheduling. Dr. Amada Talbert will continue with [employer] as a Sports Medicine Specialist   
 
 
After July 1st, I will continue to serve the [facility] as a Sports Medicine Physician. My sports medicine practice will offer expertise in the non-operative treatment of acute and chronic musculoskeletal conditions, as well as non-musculoskeletal aspects of sports medicine for patients 12 years and older. Common examples of non-musculoskeletal sports medicine include: concussion (mild traumatic brain injury), exercise prescription, injury prevention, and return to [facility] decisions for the , the [employer] and the [employer]. I hope that you and your loved ones will keep me in mind for your sports medicine needs. TESTING RESULTS If you have MyChart access:  Per federal regulations as of October 20th, 2020, all of your results will be released to you and to your physician / provider simultaneously on [employer].   
o This means that it is likely that you will have the opportunity to review your results before your physician / provider!  Since all \"critical\" abnormal results are immediately called to the office / on-call providers on nights, weekends and holidays - please refrain from calling after hours when you receive abnormal results through [address] Instead, allow time for your physician / provider to review your results and then provide interpretation and/or guidance.  If the results are significantly abnormal and require time-sensitive action - guidance will be provided both via [email or web address] and via telephone.  For all other results (interpreted as \"normal\", \"abnormal but expected\", \"reassuring / stable\", \"slightly abnormal\") - non-urgent guidance will be provided via [email or web address] communication only. Gavin  #[phone] If you do NOT have [employer] access:  If the results are significantly abnormal and require time-sensitive action - guidance will be relayed to you via telephone.  For all other results (interpreted as \"normal\", \"abnormal but expected\", \"reassuring / stable\", \"slightly abnormal\") - non-urgent guidance will be mailed to you via [email or web address] Results from Outside Facilities / Laboratories: 
Results of tests performed at outside facilities / laboratories likely will not appear in the [facility]. 
o They may appear in the patient portals of those outside facilities / laboratories. o Please keep in mind that with your access to your patient portal directly to an outside facility / laboratory, you are likely viewing results before your physician / provider! Please allow time for your physician / provider to review your results and then provide interpretation and/or guidance.  
 
 
If you have questions about your results beyond the guidance provided in [employer] or in your results letter, please schedule a follow up appointment to discuss with your physician / provider. MEDICATION REFILLS  Please request medication refills through your pharmacy, to ensure the correct pharmacy is used.  Please allow at least 2 business days for refill requests to be addressed.  Refills will not be provided by the after hours/on call provider.

## 2021-04-08 NOTE — PROGRESS NOTES
This is the Subsequent Medicare Annual Wellness Exam, performed 12 months or more after the Initial AWV or the last Subsequent AWV    I have reviewed the patient's medical history in detail and updated the computerized patient record. Assessment/Plan   Education and counseling provided:  Are appropriate based on today's review and evaluation  End-of-Life planning (with patient's consent)  Pneumococcal Vaccine  Influenza Vaccine  Hepatitis B Vaccine  Colorectal cancer screening tests  Cardiovascular screening blood test  Bone mass measurement (DEXA)  Screening for glaucoma  Diabetes screening test    1. Medicare annual wellness visit, subsequent      Depression Risk Factor Screening     3 most recent PHQ Screens 4/8/2021   Little interest or pleasure in doing things Not at all   Feeling down, depressed, irritable, or hopeless Not at all   Total Score PHQ 2 0   Trouble falling or staying asleep, or sleeping too much Several days       Alcohol Risk Screen    Do you average more than 1 drink per night or more than 7 drinks a week:  No    On any one occasion in the past three months have you have had more than 3 drinks containing alcohol:  No        Functional Ability and Level of Safety    Hearing: Hearing is good. Activities of Daily Living: The home contains: no safety equipment. Patient does total self care      Ambulation: with no difficulty     Fall Risk:  Fall Risk Assessment, last 12 mths 4/8/2021   Able to walk? Yes   Fall in past 12 months? 0   Do you feel unsteady?  0   Are you worried about falling 0      Abuse Screen:  Patient is not abused       Cognitive Screening    Has your family/caregiver stated any concerns about your memory: no     Cognitive Screening: Normal - Mini Cog Test    Health Maintenance Due     Health Maintenance Due   Topic Date Due    Hepatitis C Screening  Never done    DTaP/Tdap/Td series (1 - Tdap) Never done    Shingrix Vaccine Age 50> (1 of 2) Never done    Bone Densitometry (Dexa) Screening  Never done    Pneumococcal 65+ yrs at Risk Vaccine (1 of 2 - PCV13) Never done       Patient Care Team   Patient Care Team:  Donn Lauren MD as PCP - General (Family Medicine)  Donn Lauren MD as PCP - REHABILITATION [facility] Provider  Eber Contreras MD (Cardiology)  Berny Stuart MD (Neurosurgery)  Carmen Espinal MD (Hematology and Oncology)  Abena Hirsch MD (Nephrology)  Kim Orozco MD (Breast Surgery)  Babak Holly MD (Physical Medicine and Rehabilitation)  Harika Harrell MD (Psychiatry)  Ender Rush MD (Rheumatology)  Linda Perez MD (Obstetrics & Gynecology)  Augusto Murray MD (Radiation Oncology)  Shen Sue MD (Gastroenterology)  Celsa Nix MD (Orthopedic Surgery)  CHERYL KesslerM (Podiatry)  Last Tirado MD (Infectious Disease)  Sadia Childress DDS (Oral Surgery)    History     Patient Active Problem List   Diagnosis Code    Bladder tumor D49.4    Female cystocele N81.10    Cervical spondylosis without myelopathy M47.812    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    Dyslipidemia E78.5    Essential hypertension I10    Immunosuppressed status ([address] D84.9    Long term current use of systemic steroids Z79.52    PAF (paroxysmal atrial fibrillation) ([facility]) I48.0    Prediabetes R73.03    Renal transplant disorder T86.10    Sjogren's syndrome ([address] M35.00    Stented coronary artery Z95.5    Thoracic spondylosis without myelopathy M47.814    Coronary artery disease involving native coronary artery of native heart without angina pectoris I25.10    Crohn's disease of large intestine without complication ([address] M89.52    History of kidney transplant Z94.0    S/P parathyroidectomy ([address] E89.2    Chronic anticoagulation Z79.01    Other osteoporosis without current pathological fracture M81.8    Candida esophagitis ([address] B37.81    Malignant neoplasm of right breast ([address] C50.911    Osteomyelitis of spine (HCC) M46.20    Status post total hip replacement, right Z96.641    Thyroid nodule E04.1    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     Past Medical History:   Diagnosis Date    Arthropathy     Atrial fibrillation with RVR (HCC) 11/26/2016    Atrophic vaginitis     Bladder tumor     Coronary artery disease     Cro[facility]     DJD (degenerative joint disease)     Esophageal reflux     Female cystocele     Fibrocystic breast     Fibromyalgia     GERD (gastroesophageal reflux disease)     History of kidney transplant     IBS (irritable bowel syndrome)     Lower urinary tract symptoms (LUTS)     Nephrogenic adenoma of bladder     Nocturia     Osteopenia     Pelvic organ prolapse quantification stage 1 cystocele     Renal failure     Shingles     Sjogren's syndrome ([facility]     Sleep apnea       Past Surgical History:   Procedure Laterality Date    HX BACK SURGERY  04/2014    laminectomy T10/11    HX BACK SURGERY  09/2019    hardware removed    HX BACK SURGERY  2014    laminectomy RIGHT L5/S1    HX BILATERAL MASTECTOMY Bilateral 11/2018    HX BREAST LUMPECTOMY  1998    HX CERVICAL FUSION  2007    HX CORONARY STENT PLACEMENT  2005;2008    HX HEART CATHETERIZATION  1/2008    HX HEMORRHOIDECTOMY  1993    HX HIP REPLACEMENT Right 03/2020    HX KNEE REPLACEMENT Left 2008    HX KNEE REPLACEMENT  3/2015    left    HX LIPECTOMY  2001    HX MOHS PROCEDURES  1996    HX PARATHYROIDECTOMY  1993    HX RENAL TRANSPLANT  1992    HX TOTAL COLECTOMY  1984    iliocolectomy    HX TUBAL LIGATION  1977     Current Outpatient Medications   Medication Sig Dispense Refill    montelukast (Singulair) 10 mg tablet Take 10 mg by mouth daily.  Anucort-HC 25 mg supp       apixaban (Eliquis) 5 mg tablet Take 5 mg by mouth two (2) times a day.       desonide (TRIDESILON) 0.05 % cream APPLY TO AFFECTED AREA OF FACE TWICE A DAY AS NEEDED      ketoconazole (NIZORAL) 2 % topical cream apply to affected area ON THE FACE twice a day      pravastatin (PRAVACHOL) 20 mg tablet take 1 tablet by mouth once daily at bedtime (Patient taking differently: Take 20 mg by mouth every Monday, Wednesday, Friday.) 90 Tab 1    metoprolol tartrate (LOPRESSOR) 50 mg tablet Take 1 Tab by mouth two (2) times a day. 180 Tab 1    acetaminophen/diphenhydramine (TYLENOL PM EXTRA STRENGTH PO) Take 1 Tab by mouth nightly as needed.  carboxymethylcellulose sodium (REFRESH OP) Apply  to eye.  cholecalciferol (VITAMIN D3) (1000 Units /25 mcg) tablet Take 7,000 Units by mouth every three (3) days.  dilTIAZem SR (CARDIZEM SR) 60 mg SR capsule Take 1 Cap by mouth two (2) times a day.  krill-om-3-dha-epa-phospho-ast (KRILL OIL) 1,178-342-93-80 mg cap Take 1,000 mg by mouth daily.  methylcellulose (CITRUCEL) 500 mg tablet Take 500 mg by mouth daily.  furosemide (LASIX) 40 mg tablet Take 40 mg by mouth daily as needed.  losartan (COZAAR) 100 mg tablet Take 100 mg by mouth nightly.  multivit-min/FA/lutein/zeaxant (MACULAR VITAMIN PO) Take 1 Tab by mouth daily.  Lactobacillus acidophilus (PROBIOTIC PO) Take 1 Cap by mouth daily.  ketotifen (ZADITOR) 0.025 % (0.035 %) ophthalmic solution Administer 1 Drop to both eyes two (2) times daily as needed.  acyclovir (ZOVIRAX) 5 % ointment Apply 1.5 Each to affected area five (5) times daily. As needed      magnesium oxide (MAG-OX) 400 mg tablet Take 800 mg by mouth two (2) times a day.  potassium chloride (K-DUR, KLOR-CON) 20 mEq tablet Take 20 mEq by mouth daily.  predniSONE (DELTASONE) 5 mg tablet Take 5 mg by mouth daily.  mycophenolate sodium (MYFORTIC) 360 mg delayed release tablet Take 720 mg by mouth two (2) times a day.       doxycycline (VIBRAMYCIN) 100 mg capsule take 1 capsule by mouth twice a day       Allergies   Allergen Reactions    Atenolol Myalgia    Atorvastatin Myalgia     Other reaction(s): Unknown    Azathioprine Other (comments)     Other reaction(s): Unknown    Iodinated Contrast Media Other (comments)     Had kidney transplant      Ketorolac Tromethamine Other (comments) and Itching     Other reaction(s): Unknown       Family History   Problem Relation Age of Onset    Ovarian Cancer Mother     Diabetes Sister     Hypertension Sister     Diabetes Brother     Hypertension Brother     Hypertension Brother     Diabetes Sister      Social History     Tobacco Use    Smoking status: Former Smoker     Start date:      Quit date: 2000     Years since quittin.1    Smokeless tobacco: Never Used   Substance Use Topics    Alcohol use: No     Alcohol/week: 0.0 standard drinks         Nasima Araujo MD

## 2021-04-08 NOTE — PROGRESS NOTES
Note to patient:  The purpose of this note is to communicate optimally with the other physicians / APCs involved in your care. It is written using standard medical terminology. If you have questions regarding the details of the note, please contact my office to schedule an appointment to address your questions. Perla Barillas   Primary Care Office Visit - Problem-Oriented (Separate from the Medicare Wellness Visit)    : 1945   Kasandra Leavitt is a 77 y.o. female    Assessment/Plan:       ICD-10-CM ICD-9-CM   1. Medicare annual wellness visit, subsequent  Z00.00 V70.0   2. Immunosuppressed status ([facility]  D84.9 279.9   3. Sjogren's syndrome, with unspecified organ involvement ([facility]  M35.00 710.2   4. Crohn's disease of large intestine without complication ([facility]  K83.30 555.1   5. PAF (paroxysmal atrial fibrillation) ([facility])  I48.0 427.31   6. S/P parathyroidectomy ([facility])  E89.2 252.8   7. Malignant neoplasm of right female breast, unspecified estrogen receptor status, unspecified site of breast ([facility]  C50.911 174.9   8. Psychophysiological insomnia  F51.04 307.42   9. Allergic cough  R05 786.2       -- CV --  # paroxysmal A-Fib  # dyslipidemia  # chronic anticoagulation  # hypertension - uncontrolled today, however she admits to \"high high stress\"  > followed by cardiology  Key CAD CHF Meds             apixaban (Eliquis) 5 mg tablet (Taking) Take 5 mg by mouth two (2) times a day. pravastatin (PRAVACHOL) 20 mg tablet (Taking) take 1 tablet by mouth once daily at bedtime    metoprolol tartrate (LOPRESSOR) 50 mg tablet (Taking) Take 1 Tab by mouth two (2) times a day. dilTIAZem SR (CARDIZEM SR) 60 mg SR capsule (Taking) Take 1 Cap by mouth two (2) times a day. krill-om-3-dha-epa-phospho-ast (KRILL OIL) 1,553-489-46-80 mg cap (Taking) Take 1,000 mg by mouth daily. furosemide (LASIX) 40 mg tablet (Taking) Take 40 mg by mouth daily as needed.     losartan (COZAAR) 100 mg tablet (Taking) Take 100 mg by mouth nightly. -- endocrine --  # thyroid nodule  # s/p parathyroidectomy  > followed by endo  # \"excessive\" sweating  > encouraged to complete the 24 hour urine as ordered by endocrine      -- infectious disease --  # hx of kidney transplant  # immunosuppressed  # osteomyelitis of spine  > followed by ID      -- heme/onc --  # breast cancer - DCIS  > followed by heme/onc  # fe def - per patient per hematology  > please talk to Dr. Maricruz Najera (kidney doctor) about your iron deficiency    --  pulm --  # allergic cough  > referred to allergy       -- rheum --  # osteoporosis  Received prolia in the past  > time to check with Dr. Steinberg Reasoner re: your osteoporosis and Prolia    -- MSK --  # Left foot pain  > reminded patient that Dr. Frank Mohan recommended 'watchful waiting'  at least 8-10 months after surgery    -- psych --  # anxiety - newly addressed today   With  # insomnia   Admits to worry re: her 's health and cognition  > use buspirone 5mg as needed for anxiety / irritability; max dose is 10mg in a day (2 pills per day) for you right now. If it is working, but you think you might need a higher dose, please call. Also some people use this medication on a scheduled basis, so if it works, please keep that in mind. > restart trazodone PRN          Orders Placed This Encounter    REFERRAL TO ALLERGY     Referral Priority:   Routine     Referral Type:   Consultation     Referral Reason:   Specialty Services Required     Referred to Provider:   Jeremías Salomon MD     Number of Visits Requested:   1    busPIRone (BUSPAR) 5 mg tablet     Sig: Take 1 Tab by mouth two (2) times daily as needed (anxiety / irritability). Dispense:  60 Tab     Refill:  5    traZODone (DESYREL) 50 mg tablet     Sig: Take 1 Tab by mouth nightly.      Dispense:  90 Tab     Refill:  3         Angela Falk MD  Internal Medicine, Family Medicine & Sports Medicine  4/8/2021    History:   Maurilio GONZALES Yvette Rosen is a 77 y.o. female presenting to address:  Chief Complaint   Patient presents with   Aleksander Owens Annual Wellness Visit       last visit: Nov 2020 - virtual    Chest discomfort \"it just hurts\"  \"I am short-winded\"  \"I believe some of it is anxiety, but also allergies. I saw my eye doctor yesterday and my eyes are clogged up, and it is definitely worse with all the pollen. \"  Notes that her voice is much more hoarse. Has had \"a normal breathing test\" with pulm. Thus far has tried tussin DM, tylenol. Is taking her singulair regularly, and uses her inhaler \"but it doesn't seem to help\". Dislikes nasal steroid spray. Requests referral to allergy. Due for nephology f/u this month    Hasn't done 24h urine yet for endocrine      Last saw rheum 2 years ago. Last appt with Dr. Pooja Weiss (new infectious disease) ~Dec 2020. Did bloodwork for her 2 weeks ago, everything looked good. [ see AWV documentation for pertinent PMHx, PSHx, FHx, allergies & meds]     Problem List:     [ see AWV documentation for active problem list ]     Review of Systems:     (only positive ROS in this note, all negatives included in  [address] documentation)    Review of Systems      Physical Assessment:   VS:  [ see AWV documentation for Vital Signs ]    Physical Exam  Nursing note reviewed. Constitutional:       Appearance: She is well-developed. She is not diaphoretic. HENT:      Head: Normocephalic and atraumatic. Right Ear: Tympanic membrane, ear canal and external ear normal.      Left Ear: Tympanic membrane, ear canal and external ear normal.      Ears:      Comments: Mask in place  Neck:      Musculoskeletal: Neck supple. Thyroid: No thyromegaly. Cardiovascular:      Rate and Rhythm: Normal rate and regular rhythm. Heart sounds: No murmur. Pulmonary:      Effort: Pulmonary effort is normal.      Breath sounds: Normal breath sounds. No wheezing or rales. Musculoskeletal: Normal range of motion. Lymphadenopathy:      Cervical: No cervical adenopathy. Skin:     General: Skin is warm and dry. Neurological:      Mental Status: She is alert and oriented to person, place, and time. Cranial Nerves: No cranial nerve deficit. Coordination: Coordination normal.   Psychiatric:         Behavior: Behavior normal.         Thought Content: Thought content normal.         Judgment: Judgment normal.         Recent Labs & Imaging:       Lung scan perfusion only (3/27/21):  Negative perfusion only scan. CT chest (3/27/21):  1. No acute abnormality. 2. Left lower lobe atelectasis versus scarring. 3. Multiple tiny pulmonary nodules of the right lower lobe as above. Optional follow-up CT in one year should be considered to evaluate for stability. 4. Stable chest wall lipoma. Reviewed Records:     ED (3/27/2021):  (R06.02) Shortness of breath    (R07.89) Atypical chest pain    Personal Protective Equipment:   Personal Protective Equipment was used including; goggles, mask-surgical and hands-gloves. Patient was placed on no precaution(s). Patient was masked. ED Course/Medical Decision Makin AM -   12:58 PM -work-up completed as below. Repeat troponin has decreased back to 19. Her chest CT and VQ scan are as noted below. Discussed all results with patient and  at bedside. Patient is very focused on the CBC she had done on 2 [facility]. The initial 1 had a white count of 11.1 and the second 1 2 days later was 11.8. She is very concerned regarding the neutrophil count which was 79% on the first CBC and 81% on the second. She states that whenever her white count goes over 11 her infectious disease physician places her on antibiotic's. She follows infectious disease for previous spine infection. However, her previous spine infections have all been accompanied with pain and fever, neither of which she has currently.  She is rather fixated on minuscule changes in her numbers which are not statistically of concern. It was relayed to her that her CBC today showed a white count and neutrophil count that were lower than those previous ones. Patient inquired regarding her chest tightness and stated that she would like a work-up for that. I then went over all of her testing again reiterating that she had had a chest CT, VQ scan, chest x-ray, EKG, cardiac labs x2, BNP there were all unremarkable. At that point, patient then stated that she had been having some anxiety recently and wondering if that was underlying which it may indeed be. Recommended that she follow-up with her primary care physician and nephrologist to address that. Stable for discharge at this time. UC (3/26/21): \"wants to see if WBC is changing\"    UC (3/23/21):   ceftin 500mg BID x 10d; COVID neg     endo (3/23/21):   ASSESSMENT:    (E04.2) Multiple thyroid nodules - Plan: TSH, T4 FREE, TSH, T4 FREE, US THYROID SCAN    (L74.9) Sweating abnormality - Plan: 5 HIAA TIMED URINE    PLAN:    Current size of the thyroid nodule was compared to the ultrasound from 2011 and only changed in size by 4 mm in 10 years. Left lobe nodule is cystic with essentially no solid component. Several reverberation artifacts consistent with colloid seen within the thyroid nodule which are indicators of a benign nodule. Risks of holding anticoagulation in setting of A. fib outweighed the benefits of biopsying a nodule which is sonographically benign. Also doubt if any significant tissue would be obtained on FNA since the nodule is essentially all cystic. Guidelines state that if nodules have been stable over 5 years, they do not need continued sonographic surveillance, as per previous notes from her previous endocrinologist Dr. Alcira Dumas nodules were stable over 5 years. (From [id number]). Obtain last thyroid ultrasound from Dr. Juan Eller office from 2018 to review the size of the left lobe nodule.     Will repeat 1 more thyroid ultrasound in 6 months, if all stable then sonographic surveillance can be discontinued. TSH, free T4 normal. Recheck today as patient is having history of increased sweating. CT abdomen from November 2020: Normal pancreas and adrenal glands. Increased sweating is likely not related to hormonal abnormalities, but since she also has off-and-on flushing/history of IBSfor completion will check 24-hour urine 5-HIAA    Appointment in 6 months     ortho foot/ankle (2/25/21) - Dr. Vasile Paniagua:   L Foot pain - 2nd opinion. ASSESSMENT AND PLAN:  I told her that certainly she is very early after surgery and that she needs to followup with her operating physician.  I discussed with the patient surgical treatment, especially I told her it is somewhere around 8 to 10-month recovery.  She seemed surprised by that.  I told I do not see anything what I can do to add, just to help her alter her course what I offered at this point.  She is very early on after surgery and I think she would do best to continue to work on her flexibility, accommodate with her shoe wear.        heme/onc (Dec 2020):   Right pTXpN0(i+). DCIS is ER positive, TN negative, her 2 Karen positive (3+). Olga nodes: ER negative, TN negative, her 2-. S/p bilateral mastectomies for the DCIS component; therefore, per NCCN guidelines, is not a candidate for adjuvant hormonal therapy. => f/u q1yr;    FeDef Anemia

## 2021-04-08 NOTE — PROGRESS NOTES
Ethlyn Goltz is a 77 y.o. female (: 1945) presenting to address:    Chief Complaint   Patient presents with    Annual Wellness Visit       Vitals:    21 1302   BP: (!) 161/82   Pulse: 60   Resp: 18   Temp: 97.5 °F (36.4 °C)   TempSrc: Temporal   SpO2: 100%   Weight: 185 lb 3.2 oz (84 kg)   Height: 5' 4\" (1.626 m)       Is someone accompanying this pt? NO    Is the patient using any DME equipment during OV? NO    Hearing/Vision:      Hearing Screening    125Hz 250Hz 500Hz 1000Hz 2000Hz 3000Hz 4000Hz 6000Hz 8000Hz   Right ear:            Left ear:               Visual Acuity Screening    Right eye Left eye Both eyes   Without correction:      With correction: 20/30 20/30 20/25       Learning Assessment:     Learning Assessment 10/23/2019   PRIMARY LEARNER Patient   HIGHEST LEVEL OF EDUCATION - PRIMARY LEARNER  2 YEARS OF COLLEGE   BARRIERS PRIMARY LEARNER NONE   CO-LEARNER CAREGIVER No   PRIMARY LANGUAGE ENGLISH   LEARNER PREFERENCE PRIMARY LISTENING   ANSWERED BY stephenie   RELATIONSHIP SELF     Depression Screening:     3 most recent PHQ Screens 2021   Little interest or pleasure in doing things Not at all   Feeling down, depressed, irritable, or hopeless Not at all   Total Score PHQ 2 0   Trouble falling or staying asleep, or sleeping too much Several days     Fall Risk Assessment:     Fall Risk Assessment, last 12 mths 2021   Able to walk? Yes   Fall in past 12 months? 0   Do you feel unsteady? 0   Are you worried about falling 0     Coordination of Care Questionaire:   1. Have you been to the ER, urgent care clinic since your last visit? Hospitalized since your last visit? YES ivan gr for sinus issue and breathing     2. Have you seen or consulted any other health care providers outside of the [address] [place] [address] since your last visit? Include any pap smears or colon screening. YES podiatrist     Advanced Directive:   1. Do you have an Advanced Directive? YES    2.  Would you like information on Advanced Directives?  NO

## 2021-05-18 ENCOUNTER — TELEPHONE (OUTPATIENT)
Dept: FAMILY MEDICINE CLINIC | Age: 76
End: 2021-05-18

## 2021-05-18 NOTE — TELEPHONE ENCOUNTER
Please call to get further details:  - does she mean she got her 24h urine test done with endocrinology?  - or is she referring to her allergy consultation (only referral I ordered during the most recent visit)? Thanks.

## 2021-05-18 NOTE — TELEPHONE ENCOUNTER
Patient called stating that she went to see the specialist about her sweating. Patient states that everything checked out okay and would like to know what to do from here. Please advise.

## 2021-05-19 NOTE — TELEPHONE ENCOUNTER
At this point, as she has completed all the testing with the specialists, it is likely that the sweating is an unfortunate side effect of the anti-rejection medications that she is taking for her history of kidney transplant, so there isn't much further to do.

## 2021-05-19 NOTE — TELEPHONE ENCOUNTER
Spoke w/pt and she states the following:    She saw endocrine, Dr. Lexus Garsia, and they ordered and completed a 24 hour urine test; results were normal per the patient; Pt is scheduled for allergy testing on 6/28/2021 with Dr. Sameer Gaming; Pt states she is still sweating and since her urine test was normal, what else can she do or what is the next step?

## 2021-07-06 NOTE — TELEPHONE ENCOUNTER
Patient called in regards to getting refill medication . Requested Prescriptions     Pending Prescriptions Disp Refills    ondansetron hcl (ZOFRAN) 4 mg tablet       Sig: Take 1 Tablet by mouth every six to eight (6-8) hours as needed.

## 2021-07-06 NOTE — TELEPHONE ENCOUNTER
Last seen 4/8/21    Last filled: never entered historically and d/c on 9/21/20 due to not a current med. Called patient and left message for her to return call to clarify.

## 2021-07-07 RX ORDER — ONDANSETRON 4 MG/1
4 TABLET, FILM COATED ORAL
OUTPATIENT
Start: 2021-07-07

## 2021-07-07 NOTE — TELEPHONE ENCOUNTER
Spoke to patient she states she is being prescribed pain medication from Dr. Olga Rapp and it is causing nausea. Informed patient since Dr Jatin Garnett is no longer practicing primary care she will need to contact Dr. Leslie Srinivasan office and request the medication since is the one who prescribe the pain medication that is causing the upset stomach.

## 2022-03-18 PROBLEM — Z79.01 CHRONIC ANTICOAGULATION: Status: ACTIVE | Noted: 2017-05-02

## 2022-03-18 PROBLEM — N81.10 FEMALE CYSTOCELE: Status: ACTIVE | Noted: 2017-08-15

## 2022-03-18 PROBLEM — B37.81 CANDIDA ESOPHAGITIS (HCC): Status: ACTIVE | Noted: 2020-02-05

## 2022-03-19 PROBLEM — T86.10 RENAL TRANSPLANT DISORDER: Status: ACTIVE | Noted: 2019-08-16

## 2022-03-19 PROBLEM — Z79.52 LONG TERM CURRENT USE OF SYSTEMIC STEROIDS: Status: ACTIVE | Noted: 2019-10-29

## 2022-03-19 PROBLEM — M46.20 OSTEOMYELITIS OF SPINE (HCC): Status: ACTIVE | Noted: 2019-09-09

## 2022-03-19 PROBLEM — D05.11 DUCTAL CARCINOMA IN SITU (DCIS) OF RIGHT BREAST: Status: ACTIVE | Noted: 2018-10-02

## 2022-03-19 PROBLEM — D10.39 PAPILLOMA OF PALATE: Status: ACTIVE | Noted: 2021-02-04

## 2022-03-19 PROBLEM — Z96.641 STATUS POST TOTAL HIP REPLACEMENT, RIGHT: Status: ACTIVE | Noted: 2020-03-12

## 2022-03-19 PROBLEM — C50.911 MALIGNANT NEOPLASM OF RIGHT BREAST (HCC): Status: ACTIVE | Noted: 2018-11-06

## 2022-03-19 PROBLEM — M81.8 OTHER OSTEOPOROSIS WITHOUT CURRENT PATHOLOGICAL FRACTURE: Status: ACTIVE | Noted: 2020-02-05

## 2022-03-20 PROBLEM — E04.1 THYROID NODULE: Status: ACTIVE | Noted: 2020-12-07

## 2024-06-04 NOTE — TELEPHONE ENCOUNTER
Orders Placed This Encounter    PA IMMUNIZ ADMIN,1 SINGLE/COMB VAC/TOXOID     Standing Status:   Future     Standing Expiration Date:   2/9/2022    PA [facility] ADMIN,1 SINGLE/COMB VAC/TOXOID     Standing Status:   Future     Standing Expiration Date:   2/9/2022   [place], MODERNA, MRNA, LNP-S, PF, 100MCG/0.5ML [id number], [id number], [id number])     Standing Status:   Future     Standing Expiration Date:   2/9/2022   [place], MODERNA, MRNA, LNP-S, PF, 100MCG/0.5ML [id number], [id number], [id number])     Standing Status:   Future     Standing Expiration Date:   2/9/2022 Prophylactic measure Prophylactic measure Prophylactic measure Prophylactic measure Prophylactic measure Prophylactic measure Prophylactic measure Prophylactic measure Prophylactic measure Prophylactic measure Prophylactic measure Prophylactic measure Prophylactic measure Prophylactic measure Prophylactic measure Prophylactic measure